# Patient Record
Sex: MALE | Race: WHITE | Employment: FULL TIME | URBAN - METROPOLITAN AREA
[De-identification: names, ages, dates, MRNs, and addresses within clinical notes are randomized per-mention and may not be internally consistent; named-entity substitution may affect disease eponyms.]

---

## 2017-07-03 PROBLEM — R04.2 MASSIVE HEMOPTYSIS: Status: ACTIVE | Noted: 2017-07-03

## 2020-01-15 ENCOUNTER — HOSPITAL ENCOUNTER (INPATIENT)
Age: 35
LOS: 2 days | Discharge: HOME | DRG: 950 | End: 2020-01-17
Attending: HOSPITALIST | Admitting: HOSPITALIST
Payer: MEDICAID

## 2020-01-15 ENCOUNTER — APPOINTMENT (OUTPATIENT)
Dept: GENERAL RADIOLOGY | Age: 35
DRG: 950 | End: 2020-01-15
Attending: HOSPITALIST
Payer: MEDICAID

## 2020-01-15 DIAGNOSIS — R04.2 COUGH WITH HEMOPTYSIS: Primary | ICD-10-CM

## 2020-01-15 PROBLEM — R07.9 CHEST PAIN IN ADULT: Status: ACTIVE | Noted: 2020-01-15

## 2020-01-15 PROBLEM — Z72.0 TOBACCO ABUSE: Status: ACTIVE | Noted: 2018-02-28

## 2020-01-15 LAB
ABO + RH BLD: NORMAL
ALBUMIN SERPL-MCNC: 3.7 G/DL (ref 3.4–5)
ALBUMIN/GLOB SERPL: 1.2 {RATIO} (ref 0.8–1.7)
ALP SERPL-CCNC: 56 U/L (ref 45–117)
ALT SERPL-CCNC: 16 U/L (ref 16–61)
ANION GAP SERPL CALC-SCNC: 3 MMOL/L (ref 3–18)
APTT PPP: 27.5 SEC (ref 23–36.4)
AST SERPL-CCNC: 9 U/L (ref 10–38)
ATRIAL RATE: 60 BPM
BASOPHILS # BLD: 0 K/UL (ref 0–0.1)
BASOPHILS NFR BLD: 1 % (ref 0–2)
BILIRUB SERPL-MCNC: 0.4 MG/DL (ref 0.2–1)
BLOOD GROUP ANTIBODIES SERPL: NORMAL
BUN SERPL-MCNC: 6 MG/DL (ref 7–18)
BUN/CREAT SERPL: 7 (ref 12–20)
CALCIUM SERPL-MCNC: 8.9 MG/DL (ref 8.5–10.1)
CALCULATED P AXIS, ECG09: 57 DEGREES
CALCULATED R AXIS, ECG10: 76 DEGREES
CALCULATED T AXIS, ECG11: 66 DEGREES
CHLORIDE SERPL-SCNC: 108 MMOL/L (ref 100–111)
CO2 SERPL-SCNC: 29 MMOL/L (ref 21–32)
CREAT SERPL-MCNC: 0.88 MG/DL (ref 0.6–1.3)
DIAGNOSIS, 93000: NORMAL
DIFFERENTIAL METHOD BLD: ABNORMAL
EOSINOPHIL # BLD: 0.1 K/UL (ref 0–0.4)
EOSINOPHIL NFR BLD: 1 % (ref 0–5)
ERYTHROCYTE [DISTWIDTH] IN BLOOD BY AUTOMATED COUNT: 13.1 % (ref 11.6–14.5)
EST. AVERAGE GLUCOSE BLD GHB EST-MCNC: 100 MG/DL
GLOBULIN SER CALC-MCNC: 3.1 G/DL (ref 2–4)
GLUCOSE BLD STRIP.AUTO-MCNC: 85 MG/DL (ref 70–110)
GLUCOSE SERPL-MCNC: 80 MG/DL (ref 74–99)
HBA1C MFR BLD: 5.1 % (ref 4.2–5.6)
HCT VFR BLD AUTO: 40.1 % (ref 36–48)
HCT VFR BLD AUTO: 40.6 % (ref 36–48)
HGB BLD-MCNC: 13.7 G/DL (ref 13–16)
HGB BLD-MCNC: 13.9 G/DL (ref 13–16)
INR PPP: 1 (ref 0.8–1.2)
LYMPHOCYTES # BLD: 3.2 K/UL (ref 0.9–3.6)
LYMPHOCYTES NFR BLD: 38 % (ref 21–52)
MAGNESIUM SERPL-MCNC: 2.2 MG/DL (ref 1.6–2.6)
MCH RBC QN AUTO: 30.4 PG (ref 24–34)
MCHC RBC AUTO-ENTMCNC: 34.7 G/DL (ref 31–37)
MCV RBC AUTO: 87.7 FL (ref 74–97)
MONOCYTES # BLD: 0.6 K/UL (ref 0.05–1.2)
MONOCYTES NFR BLD: 7 % (ref 3–10)
NEUTS SEG # BLD: 4.5 K/UL (ref 1.8–8)
NEUTS SEG NFR BLD: 53 % (ref 40–73)
P-R INTERVAL, ECG05: 114 MS
PHOSPHATE SERPL-MCNC: 4.1 MG/DL (ref 2.5–4.9)
PLATELET # BLD AUTO: 172 K/UL (ref 135–420)
PMV BLD AUTO: 9.8 FL (ref 9.2–11.8)
POTASSIUM SERPL-SCNC: 3.8 MMOL/L (ref 3.5–5.5)
PROT SERPL-MCNC: 6.8 G/DL (ref 6.4–8.2)
PROTHROMBIN TIME: 13.2 SEC (ref 11.5–15.2)
Q-T INTERVAL, ECG07: 418 MS
QRS DURATION, ECG06: 100 MS
QTC CALCULATION (BEZET), ECG08: 418 MS
RBC # BLD AUTO: 4.57 M/UL (ref 4.7–5.5)
SODIUM SERPL-SCNC: 140 MMOL/L (ref 136–145)
SPECIMEN EXP DATE BLD: NORMAL
T4 FREE SERPL-MCNC: 1 NG/DL (ref 0.7–1.5)
TSH SERPL DL<=0.05 MIU/L-ACNC: 3.41 UIU/ML (ref 0.36–3.74)
VENTRICULAR RATE, ECG03: 60 BPM
WBC # BLD AUTO: 8.4 K/UL (ref 4.6–13.2)

## 2020-01-15 PROCEDURE — 80053 COMPREHEN METABOLIC PANEL: CPT

## 2020-01-15 PROCEDURE — 85730 THROMBOPLASTIN TIME PARTIAL: CPT

## 2020-01-15 PROCEDURE — 83036 HEMOGLOBIN GLYCOSYLATED A1C: CPT

## 2020-01-15 PROCEDURE — 80307 DRUG TEST PRSMV CHEM ANLYZR: CPT

## 2020-01-15 PROCEDURE — 82962 GLUCOSE BLOOD TEST: CPT

## 2020-01-15 PROCEDURE — 74011250637 HC RX REV CODE- 250/637: Performed by: HOSPITALIST

## 2020-01-15 PROCEDURE — 93005 ELECTROCARDIOGRAM TRACING: CPT

## 2020-01-15 PROCEDURE — 71045 X-RAY EXAM CHEST 1 VIEW: CPT

## 2020-01-15 PROCEDURE — 74011250636 HC RX REV CODE- 250/636: Performed by: HOSPITALIST

## 2020-01-15 PROCEDURE — 84100 ASSAY OF PHOSPHORUS: CPT

## 2020-01-15 PROCEDURE — C9113 INJ PANTOPRAZOLE SODIUM, VIA: HCPCS | Performed by: HOSPITALIST

## 2020-01-15 PROCEDURE — 36415 COLL VENOUS BLD VENIPUNCTURE: CPT

## 2020-01-15 PROCEDURE — 65660000000 HC RM CCU STEPDOWN

## 2020-01-15 PROCEDURE — 84439 ASSAY OF FREE THYROXINE: CPT

## 2020-01-15 PROCEDURE — 85610 PROTHROMBIN TIME: CPT

## 2020-01-15 PROCEDURE — 74011000250 HC RX REV CODE- 250: Performed by: HOSPITALIST

## 2020-01-15 PROCEDURE — 84443 ASSAY THYROID STIM HORMONE: CPT

## 2020-01-15 PROCEDURE — 86900 BLOOD TYPING SEROLOGIC ABO: CPT

## 2020-01-15 PROCEDURE — 74011250637 HC RX REV CODE- 250/637

## 2020-01-15 PROCEDURE — 85018 HEMOGLOBIN: CPT

## 2020-01-15 PROCEDURE — 85025 COMPLETE CBC W/AUTO DIFF WBC: CPT

## 2020-01-15 PROCEDURE — 94760 N-INVAS EAR/PLS OXIMETRY 1: CPT

## 2020-01-15 PROCEDURE — 83735 ASSAY OF MAGNESIUM: CPT

## 2020-01-15 RX ORDER — DOCUSATE SODIUM 100 MG/1
100 CAPSULE, LIQUID FILLED ORAL DAILY
Status: DISCONTINUED | OUTPATIENT
Start: 2020-01-15 | End: 2020-01-17 | Stop reason: HOSPADM

## 2020-01-15 RX ORDER — SODIUM CHLORIDE 0.9 % (FLUSH) 0.9 %
5-40 SYRINGE (ML) INJECTION AS NEEDED
Status: DISCONTINUED | OUTPATIENT
Start: 2020-01-15 | End: 2020-01-15

## 2020-01-15 RX ORDER — AMINOCAPROIC ACID 500 MG/1
1000 TABLET ORAL EVERY 8 HOURS
COMMUNITY
Start: 2020-01-12

## 2020-01-15 RX ORDER — NITROGLYCERIN 0.4 MG/1
0.4 TABLET SUBLINGUAL
COMMUNITY

## 2020-01-15 RX ORDER — NALOXONE HYDROCHLORIDE 0.4 MG/ML
0.4 INJECTION, SOLUTION INTRAMUSCULAR; INTRAVENOUS; SUBCUTANEOUS AS NEEDED
Status: DISCONTINUED | OUTPATIENT
Start: 2020-01-15 | End: 2020-01-17 | Stop reason: HOSPADM

## 2020-01-15 RX ORDER — HYDROCODONE BITARTRATE AND ACETAMINOPHEN 5; 325 MG/1; MG/1
1 TABLET ORAL
COMMUNITY

## 2020-01-15 RX ORDER — DOCUSATE SODIUM 50 MG/5ML
100 LIQUID ORAL DAILY
Status: DISCONTINUED | OUTPATIENT
Start: 2020-01-15 | End: 2020-01-15 | Stop reason: CLARIF

## 2020-01-15 RX ORDER — HYDROCODONE BITARTRATE AND ACETAMINOPHEN 5; 325 MG/1; MG/1
1 TABLET ORAL
Status: DISCONTINUED | OUTPATIENT
Start: 2020-01-15 | End: 2020-01-15

## 2020-01-15 RX ORDER — DOCUSATE SODIUM 100 MG/1
100 CAPSULE, LIQUID FILLED ORAL
COMMUNITY

## 2020-01-15 RX ORDER — SODIUM CHLORIDE 0.9 % (FLUSH) 0.9 %
5-40 SYRINGE (ML) INJECTION EVERY 8 HOURS
Status: DISCONTINUED | OUTPATIENT
Start: 2020-01-15 | End: 2020-01-17 | Stop reason: HOSPADM

## 2020-01-15 RX ORDER — MAGNESIUM SULFATE 100 %
4 CRYSTALS MISCELLANEOUS AS NEEDED
Status: DISCONTINUED | OUTPATIENT
Start: 2020-01-15 | End: 2020-01-15

## 2020-01-15 RX ORDER — BENZONATATE 100 MG/1
100 CAPSULE ORAL
COMMUNITY

## 2020-01-15 RX ORDER — AMINOCAPROIC ACID 500 MG/1
1000 TABLET ORAL EVERY 8 HOURS
Status: DISCONTINUED | OUTPATIENT
Start: 2020-01-15 | End: 2020-01-17 | Stop reason: HOSPADM

## 2020-01-15 RX ORDER — DIPHENHYDRAMINE HYDROCHLORIDE 50 MG/ML
25 INJECTION, SOLUTION INTRAMUSCULAR; INTRAVENOUS
Status: DISCONTINUED | OUTPATIENT
Start: 2020-01-15 | End: 2020-01-17 | Stop reason: HOSPADM

## 2020-01-15 RX ORDER — SODIUM CHLORIDE 9 MG/ML
75 INJECTION, SOLUTION INTRAVENOUS CONTINUOUS
Status: DISPENSED | OUTPATIENT
Start: 2020-01-15 | End: 2020-01-16

## 2020-01-15 RX ORDER — INSULIN LISPRO 100 [IU]/ML
INJECTION, SOLUTION INTRAVENOUS; SUBCUTANEOUS EVERY 6 HOURS
Status: DISCONTINUED | OUTPATIENT
Start: 2020-01-15 | End: 2020-01-15

## 2020-01-15 RX ORDER — ONDANSETRON 2 MG/ML
4 INJECTION INTRAMUSCULAR; INTRAVENOUS
Status: DISCONTINUED | OUTPATIENT
Start: 2020-01-15 | End: 2020-01-17 | Stop reason: HOSPADM

## 2020-01-15 RX ORDER — HYDROCODONE BITARTRATE AND ACETAMINOPHEN 5; 325 MG/1; MG/1
1 TABLET ORAL
Status: DISCONTINUED | OUTPATIENT
Start: 2020-01-15 | End: 2020-01-16

## 2020-01-15 RX ORDER — DEXTROSE MONOHYDRATE 100 MG/ML
125-250 INJECTION, SOLUTION INTRAVENOUS AS NEEDED
Status: DISCONTINUED | OUTPATIENT
Start: 2020-01-15 | End: 2020-01-15

## 2020-01-15 RX ADMIN — Medication 10 ML: at 06:53

## 2020-01-15 RX ADMIN — SODIUM CHLORIDE 40 MG: 9 INJECTION, SOLUTION INTRAMUSCULAR; INTRAVENOUS; SUBCUTANEOUS at 21:25

## 2020-01-15 RX ADMIN — Medication 10 ML: at 14:00

## 2020-01-15 RX ADMIN — HYDROCODONE BITARTRATE AND ACETAMINOPHEN 1 TABLET: 5; 325 TABLET ORAL at 17:33

## 2020-01-15 RX ADMIN — SODIUM CHLORIDE 75 ML/HR: 900 INJECTION, SOLUTION INTRAVENOUS at 06:57

## 2020-01-15 RX ADMIN — DOCUSATE SODIUM 100 MG: 100 CAPSULE, LIQUID FILLED ORAL at 08:56

## 2020-01-15 RX ADMIN — HYDROCODONE BITARTRATE AND ACETAMINOPHEN 1 TABLET: 5; 325 TABLET ORAL at 13:28

## 2020-01-15 RX ADMIN — HYDROCODONE BITARTRATE AND ACETAMINOPHEN 1 TABLET: 5; 325 TABLET ORAL at 21:29

## 2020-01-15 RX ADMIN — AMINOCAPROIC ACID 1000 MG: 500 TABLET ORAL at 08:56

## 2020-01-15 RX ADMIN — Medication 10 ML: at 21:25

## 2020-01-15 RX ADMIN — AMINOCAPROIC ACID 1000 MG: 500 TABLET ORAL at 22:00

## 2020-01-15 RX ADMIN — AMINOCAPROIC ACID 1000 MG: 500 TABLET ORAL at 14:00

## 2020-01-15 NOTE — CONSULTS
Interventional Radiology     Consult received from Dr. Sanchez Began for evaluation of hemoptysis.     Case and images reviewed by Dr. Scott Solis. Will plan for image-guided bronchial artery embolization with moderate sedation as IR schedule allows on Thursday, January 16th. Patient to remain NPO after midnight with any blood thinning medications held. Orders placed.  Consent to be obtained prior to procedure.      Full consult note to follow.   [address] [place] [address].

## 2020-01-15 NOTE — CONSULTS
[facility]  Pulmonary, Critical Care, and Sleep Medicine    Initial Patient Consult    Name: Trisha Ramirez MRN: [id number]   : 1985 Hospital: [facility]   Date: 1/15/2020        IMPRESSION:   · Recurrent hemoptysis with extensive work-up failing to show specific etiology. Patient states that he has a diagnosis of HHT. No imaging abnormalities to suggest bronchiectasis, cystic or vasculitic lung disease  · History of hematuria  · History of isolated positive AMELIA in 2015 with subsequent AMELIA as well as double-stranded DNA, ANCA serologies all been negative in 2017      RECOMMENDATIONS:   · Will await IR procedure for more information  · As noted he has been extensively worked up at [facility] without a definitive diagnosis to explain his continued hemoptysis-at this point agree with bronchial arteriogram to identify source of bleeding  · We will follow and make further recommendation depending on findings and course     Subjective: This patient has been seen and evaluated at the request of Kristofer Soto for recurring hemoptysis. Patient is a 37 y.o. male was accepted in transfer from [facility] by interventional radiology for bronchial arteriogram and possible embolization procedure. Patient has a long complicated history of recurring hemoptysis for the past 8 years. He describes history of large amount of coughed up blood, this is recurrent hemoptysis and also has had  hematuria. His hemoptysis started at age 32 it is present nearly daily in small amounts, \"streaky\" with interspread episodes of severe, even massive hemoptysis: On one or more occaions has had syncope after hemoptysis. He has had extensive evaluations by multiple specialists: MDs / Veryl Georgiana / Thoracic surgery and multiple Pulmonologist and GI MDs, has had EGDs and bronchoscopies multiple times, none of them, per himself, able to clearly define the source of bleeding.   He has seen [facility], [address], [facility] , [facility], [facility], thoracic surgery. 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 He more recently has been following up with Dr. Tatyana Lind pulmonologist who on her last visit has noted following  \"- I am without explanation for continued hemoptysis  -Suspect pt has strange form of HHT or nasal hemangioma that we cannot find  -has had extensive workup including multiple bronchcoscopies, one with hep gtt to induce bleeding and all have been negative  -CTA multiple times for AVMs with none found  -head and neck CT negative  -eval for extrameduallary hematopeoisis has been negative  -trials of steroids, atenolol, asthma medications  -ENT Has seen pt   -EGD negative on last admission  -tried TXA 1600 TID x 1 month with no benefit. - No imaging or lab to indicate source of bleeding\"    In addition applicable cardiology and other lab data were also reviewed. Past Medical History:   Diagnosis Date    Hemoptysis       Past Surgical History:   Procedure Laterality Date    HX APPENDECTOMY      HX ORTHOPAEDIC      right arm    HX OTHER SURGICAL      Bronchoscopy, endoscopy      Prior to Admission medications    Medication Sig Start Date End Date Taking? Authorizing Provider   HYDROcodone-acetaminophen (NORCO) 5-325 mg per tablet Take 1 Tab by mouth every four (4) hours as needed for Pain. Indications: pain   Yes Provider, Historical   ondansetron HCl (ZOFRAN IV) 4 mg by IntraVENous route every four (4) hours as needed for Nausea. Yes Provider, Historical   TRANEXAMIC ACID PO Take 1,300 mg by mouth every eight (8) hours. Indications: coughing up of blood 1/11/20 1/15/20 Yes Provider, Historical   nitroglycerin (NITROSTAT) 0.4 mg SL tablet 0.4 mg by SubLINGual route every five (5) minutes as needed for Chest Pain.  Up to 3 doses. Yes Provider, Historical   aminocaproic acid (AMICAR) 500 mg tablet Take 1,000 mg by mouth every eight (8) hours. 20  Yes Provider, Historical   benzonatate (TESSALON PERLES) 100 mg capsule Take 100 mg by mouth three (3) times daily as needed for Cough. Indications: cough   Yes Provider, Historical   docusate sodium (COLACE) 100 mg capsule Take 100 mg by mouth two (2) times daily as needed for Constipation. Indications: constipation   Yes Provider, Historical   bisacodyl 5 mg tab Take 10 mg by mouth daily as needed. Indications: constipation   Yes Provider, Historical     Allergies   Allergen Reactions    Barium Sulfate Anaphylaxis     CT oral contrast per pt    Morphine (Pf) Anaphylaxis    Barium Iodide Anaphylaxis      Social History     Tobacco Use    Smoking status: Current Every Day Smoker     Packs/day: 0.50     Years: 12.00     Pack years: 6.00     Types: Cigarettes    Smokeless tobacco: Current User   Substance Use Topics    Alcohol use: No      Family History   Problem Relation Age of Onset    Cancer Mother         Current Facility-Administered Medications   Medication Dose Route Frequency    sodium chloride (NS) flush 5-40 mL  5-40 mL IntraVENous Q8H    0.9% sodium chloride infusion  75 mL/hr IntraVENous CONTINUOUS    insulin lispro (HUMALOG) injection   SubCUTAneous Q6H    docusate sodium (COLACE) capsule 100 mg  100 mg Per NG tube DAILY    aminocaproic acid (AMICAR) tablet 1,000 mg  1,000 mg Oral Q8H       Review of Systems:  A comprehensive review of systems was negative except for that written in the HPI.     Objective:   Vital Signs:    Visit Vitals  BP 90/63 (BP 1 Location: Left arm, BP Patient Position: Supine)   Pulse 78   Temp 98.2 °F (36.8 °C)   Resp 17   Ht 5' 9\" (1.753 m)   Wt 59.7 kg (131 lb 11.2 oz)   SpO2 97%   BMI 19.45 kg/m²               Temp (24hrs), Av °F (36.7 °C), Min:97.8 °F (36.6 °C), Max:98.2 °F (36.8 °C)       Intake/Output:   Last shift:      No intake/output data recorded. Last 3 shifts: No intake/output data recorded. No intake or output data in the 24 hours ending 01/15/20 1121   Physical Exam:   General:  Alert, cooperative, no distress, appears stated age. Head:  Normocephalic, without obvious abnormality, atraumatic. Eyes:  Conjunctivae/corneas clear. PERRL, EOMs intact. Nose: Nares normal. Septum midline. Mucosa normal. No drainage or sinus tenderness. Throat: Lips, mucosa, and tongue normal. Teeth and gums normal.   Neck: Supple, symmetrical, trachea midline, no adenopathy, thyroid: no enlargment/tenderness/nodules, no carotid bruit and no JVD. Back:   Symmetric, no curvature. ROM normal.   Lungs:   Clear to auscultation bilaterally. Chest wall:  No tenderness or deformity. Heart:  Regular rate and rhythm, S1, S2 normal, no murmur, click, rub or gallop. Abdomen:   Soft, non-tender. Bowel sounds normal. No masses,  No organomegaly. Extremities: Extremities normal, atraumatic, no cyanosis or edema. Pulses: 2+ and symmetric all extremities.    Skin: Skin color, texture, turgor normal. No rashes or lesions   Lymph nodes: Cervical, supraclavicular, and axillary nodes normal.   Neurologic: Grossly nonfocal     Data review:     Recent Results (from the past 24 hour(s))   PROTHROMBIN TIME + INR    Collection Time: 01/15/20  6:16 AM   Result Value Ref Range    Prothrombin time 13.2 11.5 - 15.2 sec    INR 1.0 0.8 - 1.2     PTT    Collection Time: 01/15/20  6:16 AM   Result Value Ref Range    aPTT 27.5 23.0 - 36.4 SEC   CBC WITH AUTOMATED DIFF    Collection Time: 01/15/20  6:16 AM   Result Value Ref Range    WBC 8.4 4.6 - 13.2 K/uL    RBC 4.57 (L) 4.70 - 5.50 M/uL    HGB 13.9 13.0 - 16.0 g/dL    HCT 40.1 36.0 - 48.0 %    MCV 87.7 74.0 - 97.0 FL    MCH 30.4 24.0 - 34.0 PG    MCHC 34.7 31.0 - 37.0 g/dL    RDW 13.1 11.6 - 14.5 %    PLATELET 836 265 - 597 K/uL    MPV 9.8 9.2 - 11.8 FL    NEUTROPHILS 53 40 - 73 %    LYMPHOCYTES 38 21 - 52 % MONOCYTES 7 3 - 10 %    EOSINOPHILS 1 0 - 5 %    BASOPHILS 1 0 - 2 %    ABS. NEUTROPHILS 4.5 1.8 - 8.0 K/UL    ABS. LYMPHOCYTES 3.2 0.9 - 3.6 K/UL    ABS. MONOCYTES 0.6 0.05 - 1.2 K/UL    ABS. EOSINOPHILS 0.1 0.0 - 0.4 K/UL    ABS. BASOPHILS 0.0 0.0 - 0.1 K/UL    DF AUTOMATED     TYPE & SCREEN    Collection Time: 01/15/20  6:16 AM   Result Value Ref Range    Crossmatch Expiration 01/18/2020     ABO/Rh(D) O POSITIVE     Antibody screen NEG    METABOLIC PANEL, COMPREHENSIVE    Collection Time: 01/15/20  6:16 AM   Result Value Ref Range    Sodium 140 136 - 145 mmol/L    Potassium 3.8 3.5 - 5.5 mmol/L    Chloride 108 100 - 111 mmol/L    CO2 29 21 - 32 mmol/L    Anion gap 3 3.0 - 18 mmol/L    Glucose 80 74 - 99 mg/dL    BUN 6 (L) 7.0 - 18 MG/DL    Creatinine 0.88 0.6 - 1.3 MG/DL    BUN/Creatinine ratio 7 (L) 12 - 20      GFR est AA >60 >60 ml/min/1.73m2    GFR est non-AA >60 >60 ml/min/1.73m2    Calcium 8.9 8.5 - 10.1 MG/DL    Bilirubin, total 0.4 0.2 - 1.0 MG/DL    ALT (SGPT) 16 16 - 61 U/L    AST (SGOT) 9 (L) 10 - 38 U/L    Alk.  phosphatase 56 45 - 117 U/L    Protein, total 6.8 6.4 - 8.2 g/dL    Albumin 3.7 3.4 - 5.0 g/dL    Globulin 3.1 2.0 - 4.0 g/dL    A-G Ratio 1.2 0.8 - 1.7     MAGNESIUM    Collection Time: 01/15/20  6:16 AM   Result Value Ref Range    Magnesium 2.2 1.6 - 2.6 mg/dL   PHOSPHORUS    Collection Time: 01/15/20  6:16 AM   Result Value Ref Range    Phosphorus 4.1 2.5 - 4.9 MG/DL   TSH 3RD GENERATION    Collection Time: 01/15/20  6:16 AM   Result Value Ref Range    TSH 3.41 0.36 - 3.74 uIU/mL   T4, FREE    Collection Time: 01/15/20  6:16 AM   Result Value Ref Range    T4, Free 1.0 0.7 - 1.5 NG/DL   HEMOGLOBIN A1C WITH EAG    Collection Time: 01/15/20  6:16 AM   Result Value Ref Range    Hemoglobin A1c 5.1 4.2 - 5.6 %    Est. average glucose 100 mg/dL   EKG, 12 LEAD, INITIAL    Collection Time: 01/15/20  6:33 AM   Result Value Ref Range    Ventricular Rate 60 BPM    Atrial Rate 60 BPM    P-R Interval 114 ms    QRS Duration 100 ms    Q-T Interval 418 ms    QTC Calculation (Bezet) 418 ms    Calculated P Axis 57 degrees    Calculated R Axis 76 degrees    Calculated T Axis 66 degrees    Diagnosis       Normal sinus rhythm with sinus arrhythmia  ST elevation, probably due to early repolarization  Borderline ECG  When compared with ECG of 15-LOBO-2020 06:33,  No significant change was found     GLUCOSE, POC    Collection Time: 01/15/20  6:47 AM   Result Value Ref Range    Glucose (POC) 85 70 - 110 mg/dL     Imaging:  I have personally reviewed the patients radiographs and have reviewed the reports:  XR Results (most recent):  Results from Hospital Encounter encounter on 01/15/20   XR CHEST PORT    Narrative Portable Chest    CPT CODE: 23930    HISTORY: Cough and hemoptysis. FINDINGS:     No prior study for comparison. Several wires overlie the patient. Heart size and mediastinal contours are within normal limits. No pulmonary  vascular congestion, effusion, or pneumothorax. No acute consolidation. Old  right-sided anterior right sixth rib fracture. .      Impression IMPRESSION:    No radiographic finding for an acute cardiopulmonary process       CT Results (most recent):  Results from Hospital Encounter encounter on 07/03/17   CTA CHEST W OR W WO CONT    Narrative Indication: Hemoptysis. Impression IMPRESSION: No pulmonary embolism. Resolved pulmonary nodules. Comment: 3-D CTA of the chest was performed following administration of  intravenous contrast. Multiplanar PE protocol and MIPS projections were  obtained. This was compared with March 25, 2017 and November 15, 2016. There is no pulmonary embolism or aortic dissection. The heart is of normal  size. Aorta is of normal caliber. No pleural or pericardial effusion. Small tiny pulmonary nodules seen on prior studies have resolved. No pulmonary  infiltrate nodule or mass. No adenopathy. The adrenal glands are unremarkable.                  Complex decision making was made in the evaluation and management plans during this consultation. More than 50% of time was spent in counseling and coordination of care including review of data and discussion with other team members.          Haley Santos MD

## 2020-01-15 NOTE — PROGRESS NOTES
Patient has been seen and evaluated in room 401; discussed admission with the Pulmonary, Dr Mak-Dr Pio Slaughter to consult. Paged IR, [phone], no call back, Dr Lind Odessa was consulted from [facility]. I have accepted the patient and placed essential orders.   Full H&P note to follow            \

## 2020-01-15 NOTE — PROGRESS NOTES
CM called and spoke to [address] [place] [address]. NORM let Mendez Conteh know that this patient's facesheet said this patient had Medicaid insurance, but the patient stated he was self pay, and his Medicaid had lapsed. Mendez Conteh said this patient had over income at this time for Medicaid.     Brijesh Mccord, RN  Case Management [phone]

## 2020-01-15 NOTE — PROGRESS NOTES
Reason for Admission:  Cough with hemoptysis [R04.2]  Chest pain in adult [R07.9]                 RRAT Score:    4            Plan for utilizing home health:    TBD                      Likelihood of Readmission:   LOW                         Transition of Care Plan:              Initial assessment completed with patient. Cognitive status of patient: oriented to time, place, person and situation. Face sheet information confirmed:  yes. The patient designates his friend Osvaldo Machado [phone] to participate in his discharge plan and to receive any needed information. This patient lives in a single family home by himself . Patient is able to navigate steps as needed. Prior to hospitalization, patient was considered to be independent with ADLs/IADLS : yes . Patient has a current ACP document on file: no  The patient may need to call a cab or taxi, or need transportation to take the patient  home upon discharge. The patient already has none reported,  medical equipment available in the home. Patient is not currently active with home health. Patient has not stayed in a skilled nursing facility or rehab. This patient is on dialysis :no     Currently, the discharge plan is Home. The patient states that he can obtain his medications from the pharmacy, and take his medications as directed. Patient's current insurance is Self Pay. Care Management Interventions  PCP Verified by CM: Yes  Mode of Transport at Discharge: Self(Patient may need to call a taxi or cab, or need transport set up at time of discharge.  Patient is not sure any of his friends will be available at discharge.)  Transition of Care Consult (CM Consult): Discharge Planning  Discharge Durable Medical Equipment: No  Physical Therapy Consult: No  Occupational Therapy Consult: No  Speech Therapy Consult: No  Current Support Network: Lives Alone  Confirm Follow Up Transport: Other (see comment)(Taxi, or cab, or friends.)  Discharge Location  Discharge Placement: [address] Sabrina Walker RN  Case Management [phone]

## 2020-01-15 NOTE — PROGRESS NOTES
Problem: Falls - Risk of  Goal: *Absence of Falls  Description  Document [facility] Fall Risk and appropriate interventions in the flowsheet.   Outcome: Progressing Towards Goal  Note: Fall Risk Interventions:                                Problem: Patient Education: Go to Patient Education Activity  Goal: Patient/Family Education  Outcome: Progressing Towards Goal     Problem: Pain  Goal: *Control of Pain  Outcome: Progressing Towards Goal  Goal: *PALLIATIVE CARE:  Alleviation of Pain  Outcome: Progressing Towards Goal     Problem: Patient Education: Go to Patient Education Activity  Goal: Patient/Family Education  Outcome: Progressing Towards Goal     Problem: Injury - Risk of, Adverse Drug Event  Goal: *Absence of adverse drug events  Outcome: Progressing Towards Goal  Goal: *Absence of medication errors  Outcome: Progressing Towards Goal  Goal: *Knowledge of prescribed medications  Outcome: Progressing Towards Goal     Problem: Patient Education: Go to Patient Education Activity  Goal: Patient/Family Education  Outcome: Progressing Towards Goal

## 2020-01-15 NOTE — ROUTINE PROCESS
Primary Nurse Rosa Maria Garrido RN and Frannie Perdomo RN performed a dual skin assessment on this patient No impairment noted Luke score is 23

## 2020-01-15 NOTE — PROGRESS NOTES
9350: [facility] Pt resting quietly during shift report. 1910: Pt with bout of hemoptysis. Complaints of pain. PRN administered x2 (see MAR). Bedside shift change report given to Regina Paul (oncoming nurse) by Kristian Tamayo (offgoing nurse). Report included the following information SBAR, Intake/Output, MAR and Recent Results.

## 2020-01-15 NOTE — ROUTINE PROCESS
Bedside shift change report given to Mann Burleson (oncoming nurse) by Hakeem Boles (offgoing nurse). Report included the following information SBAR and Kardex.

## 2020-01-15 NOTE — H&P
History & Physical    Patient: Johnathon Cleveland MRN: [id number]  CSN: [id number]    YOB: 1985  Age: 37 y.o. Sex: male      DOA: 1/15/2020       HPI:     Johnathon Cleveland is a 37 y.o. male arrives in transfer from [facility] after collaboration with pulmonary Dr. Nathaly Warren and IR Dr. Hardy Cortez for planned bronchial artery arteriogram for source of recurrent hemoptysis. Mr. Etta Romano reported a history of hereditary hemorrhagic telangectasia with episodes of recurrent hemoptysis for about 8 years. He has had extensive work-up with ENT, cardiothoracic surgery, several [facility], and follows with [facility]. Most recently he was admitted to [facility] 1/10/2020 for recurrent hemoptysis. CTA could not identify the source, IR could not identify an AVM. Patient improved after 2 days of TXA and aminocaproic acid. Pulmonary has planned coordination with IR for possible intervention with bilateral bronchial artery embolization by Dr. Hardy Cortez. Patient is admitted to telemetry. Please refer to the Discharge Summary 1/15/2020 from [facility] in Chart Review tab and paper copy on clipboard.     Past Medical History:   Diagnosis Date    H/O: hematuria     Hemoptysis     History of bradycardia     Tobacco abuse      Past Surgical History:   Procedure Laterality Date    HX APPENDECTOMY      HX ORTHOPAEDIC  1989    multiple right arm fractures repaired as a child    HX OTHER SURGICAL      Bronchoscopy, endoscopy       Family History   Problem Relation Age of Onset    Cancer Mother     Other Father         killed by step-brother per patient    Bleeding Prob Other         daughter with recurrent epistaxis     Social History     Socioeconomic History    Marital status:      Spouse name: Not on file    Number of children: 3    Years of education: Not on file    Highest education level: Not on file   Occupational History    Occupation:  Social Needs    Financial resource strain: Not on file    Food insecurity:     Worry: Not on file     Inability: Not on file    Transportation needs:     Medical: Not on file     Non-medical: Not on file   Tobacco Use    Smoking status: Current Every Day Smoker     Packs/day: 0.50     Years: 12.00     Pack years: 6.00     Types: Cigarettes    Smokeless tobacco: Current User   Substance and Sexual Activity    Alcohol use: No    Drug use: No    Sexual activity: Yes   Lifestyle    Physical activity:     Days per week: Not on file     Minutes per session: Not on file    Stress: Not on file   Relationships    Social connections:     Talks on phone: Not on file     Gets together: Not on file     Attends Muslim service: Not on file     Active member of club or organization: Not on file     Attends meetings of clubs or organizations: Not on file     Relationship status: Not on file    Intimate partner violence:     Fear of current or ex partner: Not on file     Emotionally abused: Not on file     Physically abused: Not on file     Forced sexual activity: Not on file   Other Topics Concern    Not on file   Social History Narrative    Lives alone, no pets       Prior to Admission medications    Medication Sig Start Date End Date Taking? Authorizing Provider   HYDROcodone-acetaminophen (NORCO) 5-325 mg per tablet Take 1 Tab by mouth every four (4) hours as needed for Pain. Indications: pain   Yes Provider, Historical   ondansetron HCl (ZOFRAN IV) 4 mg by IntraVENous route every four (4) hours as needed for Nausea. Yes Provider, Historical   TRANEXAMIC ACID PO Take 1,300 mg by mouth every eight (8) hours. Indications: coughing up of blood 1/11/20 1/15/20 Yes Provider, Historical   nitroglycerin (NITROSTAT) 0.4 mg SL tablet 0.4 mg by SubLINGual route every five (5) minutes as needed for Chest Pain. Up to 3 doses.    Yes Provider, Historical   aminocaproic acid (AMICAR) 500 mg tablet Take 1,000 mg by mouth every eight (8) hours. 1/12/20  Yes Provider, Historical   benzonatate (TESSALON PERLES) 100 mg capsule Take 100 mg by mouth three (3) times daily as needed for Cough. Indications: cough   Yes Provider, Historical   docusate sodium (COLACE) 100 mg capsule Take 100 mg by mouth two (2) times daily as needed for Constipation. Indications: constipation   Yes Provider, Historical   bisacodyl 5 mg tab Take 10 mg by mouth daily as needed. Indications: constipation   Yes Provider, Historical       Allergies   Allergen Reactions    Barium Sulfate Anaphylaxis     CT oral contrast per pt    Morphine (Pf) Anaphylaxis    Barium Iodide Anaphylaxis     Review of systems  12 point review of systems was performed with pertinent positives as in HPI  Additionally, past medical history lists hypothyroidism and patient stated he had hypothyroidism but was not agreeable with medication  Thyroid studies today are within normal limits  Patient states he continues to smoke about half a pack a day, smoking cessation education provided with emphasis on risk of dry airways having increased risk of bleeding         Physical Exam:      Visit Vitals  /81   Pulse 82   Temp 97.8 °F (36.6 °C)   Resp 16   Ht 5' 9\" (1.753 m)   Wt 59.7 kg (131 lb 11.2 oz)   SpO2 100%   BMI 19.45 kg/m²       Physical Exam:  GENERAL: alert, cooperative, mild distress  HEENT head atraumatic, pallor, conjunctiva clear, anicteric, facial symmetry, no nasal discharge detected, pharynx clear, neck supple  Chest lungs clear, heart rate 82 regular  Abdomen soft, nontender, bowel sounds positive  Extremities moving all 4 extremities well, calves nontender, no edema, positive peripheral pulses    When RN returned to room the patient was expectorating small amount of blood into emesis bag- re-evaluated patient- self limited expectoration. Resolved. Remained alert and hemodynamically stable. Lab/Data Review:  Results for Elmira Becker (MRN [id number])   Ref.  Range 1/15/2020 06:16   WBC Latest Ref Range: 4.6 - 13.2 K/uL 8.4   RBC Latest Ref Range: 4.70 - 5.50 M/uL 4.57 (L)   HGB Latest Ref Range: 13.0 - 16.0 g/dL 13.9   HCT Latest Ref Range: 36.0 - 48.0 % 40.1   MCV Latest Ref Range: 74.0 - 97.0 FL 87.7   MCH Latest Ref Range: 24.0 - 34.0 PG 30.4   MCHC Latest Ref Range: 31.0 - 37.0 g/dL 34.7   RDW Latest Ref Range: 11.6 - 14.5 % 13.1   PLATELET Latest Ref Range: 135 - 420 K/uL 172   MPV Latest Ref Range: 9.2 - 11.8 FL 9.8   NEUTROPHILS Latest Ref Range: 40 - 73 % 53   LYMPHOCYTES Latest Ref Range: 21 - 52 % 38   MONOCYTES Latest Ref Range: 3 - 10 % 7   EOSINOPHILS Latest Ref Range: 0 - 5 % 1   BASOPHILS Latest Ref Range: 0 - 2 % 1   DF Latest Units:   AUTOMATED   ABS. NEUTROPHILS Latest Ref Range: 1.8 - 8.0 K/UL 4.5   ABS. LYMPHOCYTES Latest Ref Range: 0.9 - 3.6 K/UL 3.2   ABS. MONOCYTES Latest Ref Range: 0.05 - 1.2 K/UL 0.6   ABS. EOSINOPHILS Latest Ref Range: 0.0 - 0.4 K/UL 0.1   ABS. BASOPHILS Latest Ref Range: 0.0 - 0.1 K/UL 0.0       Results for Esa Yeboah (MRN [id number])   Ref. Range 1/15/2020 06:16   INR Latest Ref Range: 0.8 - 1.2   1.0   Prothrombin time Latest Ref Range: 11.5 - 15.2 sec 13.2   aPTT Latest Ref Range: 23.0 - 36.4 SEC 27.5     Results for Esa Yeboah (MRN [id number])    Ref.  Range 1/15/2020 06:16   Sodium Latest Ref Range: 136 - 145 mmol/L 140   Potassium Latest Ref Range: 3.5 - 5.5 mmol/L 3.8   Chloride Latest Ref Range: 100 - 111 mmol/L 108   CO2 Latest Ref Range: 21 - 32 mmol/L 29   Anion gap Latest Ref Range: 3.0 - 18 mmol/L 3   Glucose Latest Ref Range: 74 - 99 mg/dL 80   BUN Latest Ref Range: 7.0 - 18 MG/DL 6 (L)   Creatinine Latest Ref Range: 0.6 - 1.3 MG/DL 0.88   BUN/Creatinine ratio Latest Ref Range: 12 - 20   7 (L)   Calcium Latest Ref Range: 8.5 - 10.1 MG/DL 8.9   Phosphorus Latest Ref Range: 2.5 - 4.9 MG/DL 4.1   Magnesium Latest Ref Range: 1.6 - 2.6 mg/dL 2.2   GFR est non-AA Latest Ref Range: >60 ml/min/1.73m2 >60   GFR est AA Latest Ref Range: >60 ml/min/1.73m2 >60   Bilirubin, total Latest Ref Range: 0.2 - 1.0 MG/DL 0.4   Protein, total Latest Ref Range: 6.4 - 8.2 g/dL 6.8   Albumin Latest Ref Range: 3.4 - 5.0 g/dL 3.7   Globulin Latest Ref Range: 2.0 - 4.0 g/dL 3.1   A-G Ratio Latest Ref Range: 0.8 - 1.7   1.2   ALT (SGPT) Latest Ref Range: 16 - 61 U/L 16   AST Latest Ref Range: 10 - 38 U/L 9 (L)   Alk. phosphatase Latest Ref Range: 45 - 117 U/L 56   Hemoglobin A1c, (calculated) Latest Ref Range: 4.2 - 5.6 % 5.1   Est. average glucose Latest Units: mg/dL 100     Results for Erick Saavedra (MRN [id number])    Ref. Range 1/15/2020 06:16   T4, Free Latest Ref Range: 0.7 - 1.5 NG/DL 1.0   TSH Latest Ref Range: 0.36 - 3.74 uIU/mL 3.41       All Micro Results     None          Imaging Reviewed:  XR Results (most recent):  Results from Hospital Encounter encounter on 01/15/20   XR CHEST PORT    Narrative Portable Chest    CPT CODE: 37063    HISTORY: Cough and hemoptysis. FINDINGS:     No prior study for comparison. Several wires overlie the patient. Heart size and mediastinal contours are within normal limits. No pulmonary  vascular congestion, effusion, or pneumothorax. No acute consolidation. Old  right-sided anterior right sixth rib fracture. .      Impression IMPRESSION:    No radiographic finding for an acute cardiopulmonary process     EKG 1/15/2020  Normal sinus rhythm with sinus arrhythmia   ST elevation, probably due to early repolarization   Borderline ECG       Assessment:   Principal Problem:    Cough with hemoptysis (1/15/2020)    Active Problems:    Chest pain in adult (1/15/2020)      Tobacco abuse (2/28/2018)      Bradycardia (7/22/2016)      Plan:   Admit to telemetry  Pulmonary consulted, appreciated  IR has been consulted from [facility] monitor  Monitor H&H  Type and screen  UA pending  Will continue Amicar  Had 5 days further of TXA-will hold for now  NPO pending IR eval today  Parenteral fluids while NPO  Pantoprazole 40 mg daily   TEDS and SCD's when in bed    Discussed advanced directives  Full Code    Reggie Dunn,   1/15/2020, 6:23 AM

## 2020-01-15 NOTE — ROUTINE PROCESS
TRANSFER - IN REPORT: 
 
Verbal report received from [facility]) on [address]  being received from [facility]) for transfer Report consisted of patients Situation, Background, Assessment and  
Recommendations(SBAR). Information from the following report(s) SBAR and Kardex was reviewed with the receiving nurse. Opportunity for questions and clarification was provided. Assessment completed upon patients arrival to unit and care assumed.

## 2020-01-15 NOTE — PROGRESS NOTES
conducted an initial consultation and Spiritual Assessment for Minerva Palacio, who is a 37 y.o.,male. Patients Primary Language is: [place]. According to the patients EMR Alevism Affiliation is: [place]. The reason the Patient came to the hospital is:   Patient Active Problem List    Diagnosis Date Noted    Cough with hemoptysis 01/15/2020    Chest pain in adult 01/15/2020    Tobacco abuse 02/28/2018    Massive hemoptysis 07/03/2017    Hypothyroidism 07/22/2016    Bradycardia 07/22/2016    Abnormal CT scan, chest 08/06/2015        The  provided the following Interventions:  Initiated a relationship of care and support. Explored issues of rocio, spirituality and/or Advent needs while hospitalized. Listened empathically. Provided chaplaincy education. Provided information about Spiritual Care Services. Offered prayer and assurance of continued prayers on patient's behalf. Chart reviewed. The following outcomes were achieved:  Patient shared some information about their medical narrative and spiritual journey/beliefs. Patient processed feeling about current hospitalization. Patient expressed gratitude for the 's visit. Assessment:  Patient did not indicate any spiritual or Advent issues which require Spiritual Care Services interventions at this time. Patient does not have any Advent/cultural needs that will affect patients preferences in health care. Plan:  Chaplains will continue to follow and will provide pastoral care on an as needed or requested basis.  recommends bedside caregivers page  on duty if patient shows signs of acute spiritual or emotional distress.     [address] [facility] Department  [phone]

## 2020-01-16 ENCOUNTER — HOSPITAL ENCOUNTER (INPATIENT)
Dept: INTERVENTIONAL RADIOLOGY/VASCULAR | Age: 35
Discharge: HOME | DRG: 950 | End: 2020-01-16
Attending: HOSPITALIST | Admitting: HOSPITALIST
Payer: MEDICAID

## 2020-01-16 VITALS
RESPIRATION RATE: 13 BRPM | HEART RATE: 40 BPM | DIASTOLIC BLOOD PRESSURE: 73 MMHG | OXYGEN SATURATION: 100 % | SYSTOLIC BLOOD PRESSURE: 137 MMHG

## 2020-01-16 LAB
AMPHET UR QL SCN: NEGATIVE
APPEARANCE UR: CLEAR
BACTERIA URNS QL MICRO: ABNORMAL /HPF
BARBITURATES UR QL SCN: NEGATIVE
BENZODIAZ UR QL: POSITIVE
BILIRUB UR QL: NEGATIVE
CANNABINOIDS UR QL SCN: POSITIVE
COCAINE UR QL SCN: NEGATIVE
COLOR UR: YELLOW
EPITH CASTS URNS QL MICRO: ABNORMAL /LPF (ref 0–5)
GLUCOSE BLD STRIP.AUTO-MCNC: 107 MG/DL (ref 70–110)
GLUCOSE BLD STRIP.AUTO-MCNC: 96 MG/DL (ref 70–110)
GLUCOSE UR STRIP.AUTO-MCNC: NEGATIVE MG/DL
HCT VFR BLD AUTO: 37.4 % (ref 36–48)
HCT VFR BLD AUTO: 38.9 % (ref 36–48)
HDSCOM,HDSCOM: ABNORMAL
HGB BLD-MCNC: 12.9 G/DL (ref 13–16)
HGB BLD-MCNC: 13.4 G/DL (ref 13–16)
HGB UR QL STRIP: NEGATIVE
KETONES UR QL STRIP.AUTO: NEGATIVE MG/DL
LEUKOCYTE ESTERASE UR QL STRIP.AUTO: NEGATIVE
METHADONE UR QL: NEGATIVE
NITRITE UR QL STRIP.AUTO: NEGATIVE
OPIATES UR QL: POSITIVE
PCP UR QL: NEGATIVE
PH UR STRIP: 5 [PH] (ref 5–8)
PROT UR STRIP-MCNC: NEGATIVE MG/DL
RBC #/AREA URNS HPF: ABNORMAL /HPF (ref 0–5)
SP GR UR REFRACTOMETRY: >1.03 (ref 1–1.03)
UROBILINOGEN UR QL STRIP.AUTO: 1 EU/DL (ref 0.2–1)
WBC URNS QL MICRO: ABNORMAL /HPF (ref 0–4)

## 2020-01-16 PROCEDURE — 82962 GLUCOSE BLOOD TEST: CPT

## 2020-01-16 PROCEDURE — 77030004561 HC CATH ANGI DX COBRA ANGI -B

## 2020-01-16 PROCEDURE — 74011250637 HC RX REV CODE- 250/637: Performed by: INTERNAL MEDICINE

## 2020-01-16 PROCEDURE — 03LY3DZ OCCLUSION OF UPPER ARTERY WITH INTRALUMINAL DEVICE, PERCUTANEOUS APPROACH: ICD-10-PCS | Performed by: RADIOLOGY

## 2020-01-16 PROCEDURE — 85018 HEMOGLOBIN: CPT

## 2020-01-16 PROCEDURE — B31SYZZ FLUOROSCOPY OF RIGHT PULMONARY ARTERY USING OTHER CONTRAST: ICD-10-PCS | Performed by: RADIOLOGY

## 2020-01-16 PROCEDURE — B31LYZZ FLUOROSCOPY OF INTERCOSTAL AND BRONCHIAL ARTERIES USING OTHER CONTRAST: ICD-10-PCS | Performed by: RADIOLOGY

## 2020-01-16 PROCEDURE — 74011636320 HC RX REV CODE- 636/320: Performed by: RADIOLOGY

## 2020-01-16 PROCEDURE — 77030016064 HC PARTIC EMBSPHR BSPH -D

## 2020-01-16 PROCEDURE — 74011250636 HC RX REV CODE- 250/636

## 2020-01-16 PROCEDURE — 77030008584 HC TOOL GDWRE DEV TERU -A

## 2020-01-16 PROCEDURE — 36415 COLL VENOUS BLD VENIPUNCTURE: CPT

## 2020-01-16 PROCEDURE — C1769 GUIDE WIRE: HCPCS

## 2020-01-16 PROCEDURE — 74011000250 HC RX REV CODE- 250: Performed by: RADIOLOGY

## 2020-01-16 PROCEDURE — 77030022017 HC DRSG HEMO QCLOT ZMED -A

## 2020-01-16 PROCEDURE — 74011250637 HC RX REV CODE- 250/637: Performed by: HOSPITALIST

## 2020-01-16 PROCEDURE — B41GYZZ FLUOROSCOPY OF LEFT LOWER EXTREMITY ARTERIES USING OTHER CONTRAST: ICD-10-PCS | Performed by: RADIOLOGY

## 2020-01-16 PROCEDURE — 74011250637 HC RX REV CODE- 250/637: Performed by: FAMILY MEDICINE

## 2020-01-16 PROCEDURE — 37244 VASC EMBOLIZE/OCCLUDE BLEED: CPT

## 2020-01-16 PROCEDURE — 81001 URINALYSIS AUTO W/SCOPE: CPT

## 2020-01-16 PROCEDURE — C1760 CLOSURE DEV, VASC: HCPCS

## 2020-01-16 PROCEDURE — B31TYZZ FLUOROSCOPY OF LEFT PULMONARY ARTERY USING OTHER CONTRAST: ICD-10-PCS | Performed by: RADIOLOGY

## 2020-01-16 PROCEDURE — 74011250636 HC RX REV CODE- 250/636: Performed by: RADIOLOGY

## 2020-01-16 PROCEDURE — 65660000000 HC RM CCU STEPDOWN

## 2020-01-16 RX ORDER — MIDAZOLAM HYDROCHLORIDE 1 MG/ML
1 INJECTION, SOLUTION INTRAMUSCULAR; INTRAVENOUS
Status: DISCONTINUED | OUTPATIENT
Start: 2020-01-16 | End: 2020-01-17 | Stop reason: ALTCHOICE

## 2020-01-16 RX ORDER — FENTANYL CITRATE 50 UG/ML
12.5-5 INJECTION, SOLUTION INTRAMUSCULAR; INTRAVENOUS
Status: DISCONTINUED | OUTPATIENT
Start: 2020-01-16 | End: 2020-01-17 | Stop reason: ALTCHOICE

## 2020-01-16 RX ORDER — FENTANYL CITRATE 50 UG/ML
INJECTION, SOLUTION INTRAMUSCULAR; INTRAVENOUS
Status: COMPLETED
Start: 2020-01-16 | End: 2020-01-16

## 2020-01-16 RX ORDER — HEPARIN SODIUM 200 [USP'U]/100ML
INJECTION, SOLUTION INTRAVENOUS
Status: DISPENSED
Start: 2020-01-16 | End: 2020-01-16

## 2020-01-16 RX ORDER — LIDOCAINE HYDROCHLORIDE 10 MG/ML
30 INJECTION, SOLUTION EPIDURAL; INFILTRATION; INTRACAUDAL; PERINEURAL ONCE
Status: COMPLETED | OUTPATIENT
Start: 2020-01-16 | End: 2020-01-16

## 2020-01-16 RX ORDER — MIDAZOLAM HYDROCHLORIDE 1 MG/ML
INJECTION, SOLUTION INTRAMUSCULAR; INTRAVENOUS
Status: COMPLETED
Start: 2020-01-16 | End: 2020-01-16

## 2020-01-16 RX ORDER — OXYCODONE AND ACETAMINOPHEN 5; 325 MG/1; MG/1
1-2 TABLET ORAL
Status: DISCONTINUED | OUTPATIENT
Start: 2020-01-16 | End: 2020-01-17

## 2020-01-16 RX ORDER — IODIXANOL 320 MG/ML
200 INJECTION, SOLUTION INTRAVASCULAR
Status: COMPLETED | OUTPATIENT
Start: 2020-01-16 | End: 2020-01-16

## 2020-01-16 RX ORDER — HYDROCODONE BITARTRATE AND ACETAMINOPHEN 5; 325 MG/1; MG/1
1 TABLET ORAL ONCE
Status: COMPLETED | OUTPATIENT
Start: 2020-01-16 | End: 2020-01-16

## 2020-01-16 RX ADMIN — FENTANYL CITRATE 50 MCG: 50 INJECTION, SOLUTION INTRAMUSCULAR; INTRAVENOUS at 09:10

## 2020-01-16 RX ADMIN — FENTANYL CITRATE 50 MCG: 50 INJECTION, SOLUTION INTRAMUSCULAR; INTRAVENOUS at 10:35

## 2020-01-16 RX ADMIN — AMINOCAPROIC ACID 1000 MG: 500 TABLET ORAL at 06:00

## 2020-01-16 RX ADMIN — MIDAZOLAM 1 MG: 1 INJECTION INTRAMUSCULAR; INTRAVENOUS at 09:10

## 2020-01-16 RX ADMIN — MIDAZOLAM 1 MG: 1 INJECTION INTRAMUSCULAR; INTRAVENOUS at 11:15

## 2020-01-16 RX ADMIN — AMINOCAPROIC ACID 1000 MG: 500 TABLET ORAL at 14:00

## 2020-01-16 RX ADMIN — Medication 10 ML: at 21:53

## 2020-01-16 RX ADMIN — MIDAZOLAM 1 MG: 1 INJECTION INTRAMUSCULAR; INTRAVENOUS at 10:35

## 2020-01-16 RX ADMIN — FENTANYL CITRATE 50 MCG: 50 INJECTION, SOLUTION INTRAMUSCULAR; INTRAVENOUS at 09:20

## 2020-01-16 RX ADMIN — HYDROCODONE BITARTRATE AND ACETAMINOPHEN 1 TABLET: 5; 325 TABLET ORAL at 02:10

## 2020-01-16 RX ADMIN — OXYCODONE HYDROCHLORIDE AND ACETAMINOPHEN 2 TABLET: 5; 325 TABLET ORAL at 21:53

## 2020-01-16 RX ADMIN — HYDROCODONE BITARTRATE AND ACETAMINOPHEN 1 TABLET: 5; 325 TABLET ORAL at 07:46

## 2020-01-16 RX ADMIN — Medication 10 ML: at 13:28

## 2020-01-16 RX ADMIN — MIDAZOLAM 1 MG: 1 INJECTION INTRAMUSCULAR; INTRAVENOUS at 09:20

## 2020-01-16 RX ADMIN — HYDROCODONE BITARTRATE AND ACETAMINOPHEN 1 TABLET: 5; 325 TABLET ORAL at 02:21

## 2020-01-16 RX ADMIN — LIDOCAINE HYDROCHLORIDE 30 ML: 10 INJECTION, SOLUTION EPIDURAL; INFILTRATION; INTRACAUDAL; PERINEURAL at 09:13

## 2020-01-16 RX ADMIN — Medication 10 ML: at 07:52

## 2020-01-16 RX ADMIN — FENTANYL CITRATE 50 MCG: 50 INJECTION, SOLUTION INTRAMUSCULAR; INTRAVENOUS at 11:15

## 2020-01-16 RX ADMIN — AMINOCAPROIC ACID 1000 MG: 500 TABLET ORAL at 22:00

## 2020-01-16 RX ADMIN — OXYCODONE HYDROCHLORIDE AND ACETAMINOPHEN 2 TABLET: 5; 325 TABLET ORAL at 18:04

## 2020-01-16 RX ADMIN — FENTANYL CITRATE 50 MCG: 50 INJECTION, SOLUTION INTRAMUSCULAR; INTRAVENOUS at 10:15

## 2020-01-16 RX ADMIN — FENTANYL CITRATE 50 MCG: 50 INJECTION, SOLUTION INTRAMUSCULAR; INTRAVENOUS at 09:15

## 2020-01-16 RX ADMIN — IODIXANOL 200 ML: 320 INJECTION, SOLUTION INTRAVASCULAR at 09:32

## 2020-01-16 RX ADMIN — MIDAZOLAM 1 MG: 1 INJECTION INTRAMUSCULAR; INTRAVENOUS at 10:15

## 2020-01-16 RX ADMIN — HYDROCODONE BITARTRATE AND ACETAMINOPHEN 1 TABLET: 5; 325 TABLET ORAL at 13:26

## 2020-01-16 RX ADMIN — MIDAZOLAM 1 MG: 1 INJECTION INTRAMUSCULAR; INTRAVENOUS at 09:15

## 2020-01-16 NOTE — PROGRESS NOTES
[facility]  Progress Note    Patient: Patricia Prescott Age: 37 y.o. : 1985 MR#: [id number] SSN: [id number]  Date: 2020    Subjective/24-hour events:     Complains of some pain but otherwise nothing new or acute. Denies dizziness/lightheadedness but does report having had an episode of hemoptysis earlier this AM.    Assessment:   Hemoptysis, etiology uncertain  Bradycardia  Tobacco use disorder    Plan:  Continue to monitor post procedure, follow H&H PRN. Monitor heart rates. Analgesia as necessary. Would benefit from smoking cessation. Best supportive care o/w. Follow. Case discussed with:  [x]Patient  []Family  [x]Nursing  [x]Case Management  DVT Prophylaxis:  []Lovenox  []Hep SQ  []SCDs  []Coumadin   []On Heparin gtt    Objective:   VS:   Visit Vitals  /59 (BP 1 Location: Left arm, BP Patient Position: Supine)   Pulse (!) 47   Temp 97.9 °F (36.6 °C)   Resp 17   Ht 5' 9\" (1.753 m)   Wt 59.7 kg (131 lb 11.2 oz)   SpO2 98%   BMI 19.45 kg/m²      Tmax/24hrs: Temp (24hrs), Av.8 °F (36.6 °C), Min:97 °F (36.1 °C), Max:98.1 °F (36.7 °C)      Intake/Output Summary (Last 24 hours) at 2020 1516  Last data filed at 1/15/2020 1915  Gross per 24 hour   Intake 1803.75 ml   Output 250 ml   Net 1553.75 ml       General: In NAD. Cardiovascular: Regular, mildly bradycardic. Pulmonary: Clear, no wheezes. GI: Abdomen soft, nontender to palpation  Extremities: Warm, no edema. Additional: Awake and alert, moves extremities spontaneously.     Labs:    Recent Results (from the past 24 hour(s))   HGB & HCT    Collection Time: 01/15/20  8:25 PM   Result Value Ref Range    HGB 13.7 13.0 - 16.0 g/dL    HCT 40.6 36.0 - 48.0 %   GLUCOSE, POC    Collection Time: 20 12:29 AM   Result Value Ref Range    Glucose (POC) 107 70 - 110 mg/dL   HGB & HCT    Collection Time: 20  4:44 AM   Result Value Ref Range    HGB 13.4 13.0 - 16.0 g/dL    HCT 38.9 36.0 - 48.0 % GLUCOSE, POC    Collection Time: 01/16/20  6:08 AM   Result Value Ref Range    Glucose (POC) 96 70 - 110 mg/dL   HGB & HCT    Collection Time: 01/16/20 12:54 PM   Result Value Ref Range    HGB 12.9 (L) 13.0 - 16.0 g/dL    HCT 37.4 36.0 - 48.0 %   URINALYSIS W/MICROSCOPIC    Collection Time: 01/16/20  2:00 PM   Result Value Ref Range    Color YELLOW      Appearance CLEAR      Specific gravity >1.030 (H) 1.005 - 1.030    pH (UA) 5.0 5.0 - 8.0      Protein NEGATIVE  NEG mg/dL    Glucose NEGATIVE  NEG mg/dL    Ketone NEGATIVE  NEG mg/dL    Bilirubin NEGATIVE  NEG      Blood NEGATIVE  NEG      Urobilinogen 1.0 0.2 - 1.0 EU/dL    Nitrites NEGATIVE  NEG      Leukocyte Esterase NEGATIVE  NEG      WBC NONE 0 - 4 /hpf    RBC NONE 0 - 5 /hpf    Epithelial cells FEW 0 - 5 /lpf    Bacteria FEW (A) NEG /hpf       Signed By: Sue Parra MD     January 16, 2020

## 2020-01-16 NOTE — PROGRESS NOTES
Preprocedure Assessment      Today 1/16/2020     Indication/Symptoms:   Wily Perez is a 37 y.o. male with a history of recurrent hemoptysis who presents to IR for an image-guided bronchial angiogram +/- embolization with moderate sedation. Medications and labs reviewed. Patient has been NPO since midnight. Blood thinners held. The H & P and/or progress notes and any available imaging were reviewed. The risks, indications and possible alternatives to the procedure, including doing nothing, were discussed and informed consent was obtained. Physical Exam:      Heart:  Regular rate   Lungs:  Normal respiratory effort    The patient is an appropriate candidate to undergo the planned procedure and sedation.     Sissy Never, [address]

## 2020-01-16 NOTE — PROGRESS NOTES
[facility]  Pulmonary, Critical Care, and Sleep Medicine    Progress Note    Name: Chris Yanez MRN: [id number]   : 1985 Hospital: [address] [place] [address]   Date: 2020        IMPRESSION:   · Recurrent hemoptysis with extensive work-up failing to show specific etiology. Patient states that he has a diagnosis of HHT. No imaging abnormalities to suggest bronchiectasis, cystic or vasculitic lung disease. S/P bronchial artery embolization- bilateral 2020  · Chest Pain- post procedure will need monitoring  · History of hematuria  · History of isolated positive AMELIA in  with subsequent AMELIA as well as double-stranded DNA, ANCA serologies all been negative in 2017      RECOMMENDATIONS:   · Monitor for hemoptysis post procedure  · As noted he has been extensively worked up at [place] without a definitive diagnosis to explain his continued hemoptysis. No further testing needed  · We will follow and make further recommendation depending on course     Subjective: This patient has been seen and evaluated at the request of Agatha Eller for recurring hemoptysis. Patient is a 37 y.o. male was accepted in transfer from [facility] by interventional radiology for bronchial arteriogram and possible embolization procedure. Patient has a long complicated history of recurring hemoptysis for the past 8 years. 20   Seen post procedure- bronchial arterial embolization. C/o chest pain- all across  Denies hemoptysis  Denies cough  Laying flat  No nausea  Denies other complaints      HPI:  He describes history of large amount of coughed up blood, this is recurrent hemoptysis and also has had  hematuria. His hemoptysis started at age 32 it is present nearly daily in small amounts, \"streaky\" with interspread episodes of severe, even massive hemoptysis: On one or more occaions has had syncope after hemoptysis.   He has had extensive evaluations by multiple specialists: MDs / Jodee Verdeo / Thoracic surgery and multiple Pulmonologist and GI MDs, has had EGDs and bronchoscopies multiple times, none of them, per himself, able to clearly define the source of bleeding. He has seen [facility], [address] [place] [address], [facility] , [facility], [facility], thoracic surgery. 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 He more recently has been following up with Dr. Stewart Mclean pulmonologist who on her last visit has noted following  \"- I am without explanation for continued hemoptysis  -Suspect pt has strange form of HHT or nasal hemangioma that we cannot find  -has had extensive workup including multiple bronchcoscopies, one with hep gtt to induce bleeding and all have been negative  -CTA multiple times for AVMs with none found  -head and neck CT negative  -eval for extrameduallary hematopeoisis has been negative  -trials of steroids, atenolol, asthma medications  -ENT Has seen pt   -EGD negative on last admission  -tried TXA 1600 TID x 1 month with no benefit. - No imaging or lab to indicate source of bleeding\"    In addition applicable cardiology and other lab data were also reviewed. Past Medical History:   Diagnosis Date    H/O: hematuria     Hemoptysis     History of bradycardia     Tobacco abuse       Past Surgical History:   Procedure Laterality Date    HX APPENDECTOMY      HX ORTHOPAEDIC  1989    multiple right arm fractures repaired as a child    HX OTHER SURGICAL      Bronchoscopy, endoscopy      Prior to Admission medications    Medication Sig Start Date End Date Taking? Authorizing Provider   HYDROcodone-acetaminophen (NORCO) 5-325 mg per tablet Take 1 Tab by mouth every four (4) hours as needed for Pain.  Indications: pain   Yes Provider, Historical   ondansetron HCl (ZOFRAN IV) 4 mg by IntraVENous route every four (4) hours as needed for Nausea. Yes Provider, Historical   TRANEXAMIC ACID PO Take 1,300 mg by mouth every eight (8) hours. Indications: coughing up of blood 1/11/20 1/15/20 Yes Provider, Historical   nitroglycerin (NITROSTAT) 0.4 mg SL tablet 0.4 mg by SubLINGual route every five (5) minutes as needed for Chest Pain. Up to 3 doses. Yes Provider, Historical   aminocaproic acid (AMICAR) 500 mg tablet Take 1,000 mg by mouth every eight (8) hours. 20  Yes Provider, Historical   benzonatate (TESSALON PERLES) 100 mg capsule Take 100 mg by mouth three (3) times daily as needed for Cough. Indications: cough   Yes Provider, Historical   docusate sodium (COLACE) 100 mg capsule Take 100 mg by mouth two (2) times daily as needed for Constipation. Indications: constipation   Yes Provider, Historical   bisacodyl 5 mg tab Take 10 mg by mouth daily as needed. Indications: constipation   Yes Provider, Historical     Allergies   Allergen Reactions    Barium Sulfate Anaphylaxis     CT oral contrast per pt    Morphine (Pf) Anaphylaxis    Barium Iodide Anaphylaxis      Current Facility-Administered Medications   Medication Dose Route Frequency    sodium chloride (NS) flush 5-40 mL  5-40 mL IntraVENous Q8H    docusate sodium (COLACE) capsule 100 mg  100 mg Per NG tube DAILY    aminocaproic acid (AMICAR) tablet 1,000 mg  1,000 mg Oral Q8H    pantoprazole (PROTONIX) 40 mg in 0.9% sodium chloride 10 mL injection  40 mg IntraVENous DAILY       Review of Systems:  A comprehensive review of systems was negative except for that written in the HPI.     Objective:   Vital Signs:    Visit Vitals  /59 (BP 1 Location: Left arm, BP Patient Position: Supine)   Pulse (!) 47   Temp 97.9 °F (36.6 °C)   Resp 17   Ht 5' 9\" (1.753 m)   Wt 59.7 kg (131 lb 11.2 oz)   SpO2 98%   BMI 19.45 kg/m²       O2 Device: Room air       Temp (24hrs), Av.8 °F (36.6 °C), Min:97 °F (36.1 °C), Max:98.1 °F (36.7 °C)       Intake/Output:   Last shift: No intake/output data recorded. Last 3 shifts: 01/14 1901 - 01/16 0700  In: 1803.8 [P.O.:960; I.V.:843.8]  Out: 250 [Urine:250]    Intake/Output Summary (Last 24 hours) at 1/16/2020 1403  Last data filed at 1/15/2020 1915  Gross per 24 hour   Intake 1803.75 ml   Output 250 ml   Net 1553.75 ml      Physical Exam:   General:  Alert, cooperative, no distress, appears stated age. Head:  Normocephalic, without obvious abnormality, atraumatic. Eyes:  Conjunctivae/corneas clear. PERRL, EOMs intact. Nose: Nares normal. Septum midline. Mucosa normal. No drainage or sinus tenderness. Throat: Lips, mucosa, and tongue normal. Teeth and gums normal.   Neck: Supple, symmetrical, trachea midline, no adenopathy, thyroid: no enlargment/tenderness/nodules, no carotid bruit and no JVD. Back:   Symmetric, no curvature. ROM normal.   Lungs:   Clear to auscultation bilaterally. Chest wall:  No tenderness or deformity. Heart:  Regular rate and rhythm, S1, S2 normal, no murmur, click, rub or gallop. Abdomen:   Soft, non-tender. Bowel sounds normal. No masses,  No organomegaly. Extremities: Extremities normal, atraumatic, no cyanosis or edema. Pulses: 2+ and symmetric all extremities.    Skin: Skin color, texture, turgor normal. No rashes or lesions   Lymph nodes: Cervical, supraclavicular, and axillary nodes normal.   Neurologic: Grossly nonfocal     Data review:     Recent Results (from the past 24 hour(s))   HGB & HCT    Collection Time: 01/15/20  8:25 PM   Result Value Ref Range    HGB 13.7 13.0 - 16.0 g/dL    HCT 40.6 36.0 - 48.0 %   GLUCOSE, POC    Collection Time: 01/16/20 12:29 AM   Result Value Ref Range    Glucose (POC) 107 70 - 110 mg/dL   HGB & HCT    Collection Time: 01/16/20  4:44 AM   Result Value Ref Range    HGB 13.4 13.0 - 16.0 g/dL    HCT 38.9 36.0 - 48.0 %   GLUCOSE, POC    Collection Time: 01/16/20  6:08 AM   Result Value Ref Range    Glucose (POC) 96 70 - 110 mg/dL   HGB & HCT    Collection Time: 01/16/20 12:54 PM   Result Value Ref Range    HGB 12.9 (L) 13.0 - 16.0 g/dL    HCT 37.4 36.0 - 48.0 %     Imaging:  I have personally reviewed the patients radiographs and have reviewed the reports:  XR Results (most recent):  Results from Hospital Encounter encounter on 01/15/20   XR CHEST PORT    Narrative Portable Chest    CPT CODE: 58902    HISTORY: Cough and hemoptysis. FINDINGS:     No prior study for comparison. Several wires overlie the patient. Heart size and mediastinal contours are within normal limits. No pulmonary  vascular congestion, effusion, or pneumothorax. No acute consolidation. Old  right-sided anterior right sixth rib fracture. .      Impression IMPRESSION:    No radiographic finding for an acute cardiopulmonary process       CT Results (most recent):  Results from Hospital Encounter encounter on 07/03/17   CTA CHEST W OR W WO CONT    Narrative Indication: Hemoptysis. Impression IMPRESSION: No pulmonary embolism. Resolved pulmonary nodules. Comment: 3-D CTA of the chest was performed following administration of  intravenous contrast. Multiplanar PE protocol and MIPS projections were  obtained. This was compared with March 25, 2017 and November 15, 2016. There is no pulmonary embolism or aortic dissection. The heart is of normal  size. Aorta is of normal caliber. No pleural or pericardial effusion. Small tiny pulmonary nodules seen on prior studies have resolved. No pulmonary  infiltrate nodule or mass. No adenopathy. The adrenal glands are unremarkable. Complex decision making was made in the evaluation and management plans during this consultation. More than 50% of time was spent in counseling and coordination of care including review of data and discussion with other team members.          Ishmael Galeano MD

## 2020-01-16 NOTE — PROGRESS NOTES
Verbal report given to Miesha Howard on [address]. Patient NAD, VSS and A&O x3. Patient w/ complaint of chest discomfort after procedure, receiving RN aware. Patient to be supine for 4 hrs after procedure. Patient able to maintain patent airway and does not need supplemental O2. Patient able to wiggle fingers and toes appropriately.

## 2020-01-16 NOTE — PROGRESS NOTES
1915: Assumed patient care from [address] [place] [address]. Patient is alert and oriented to person, place, time and situation. Respiratory status Is stable on room air. Vital signs are stable. MEWS score is a one. Patient states he has abdominal pain 3/10, but denies nausea vomiting dizziness or anxiety. White board and fall card is updated. Bed is locked and in lowest position. Call bell, water and personal belongings are within reach. Patient has no questions, comments or concerns after bedside shift report. 2130: Patient asked for and received one 5/325 mg Norco for left lateral chest pain which he rated at a 6/10, with five being an acceptable level. 2230: Patient rated his pain at a 3/10    0210: Patient asked for and received one 5/325 mg Norco for left lateral chest pain which he rated at a 6/10, with five being an acceptable level. 0300: Patient appears to be sleeping with a respiratory rate grater than ten and no signs of distress noted. 0700: Patient had an uneventful shift. Respiratory status, vital signs and MEWS score remained stable. Patient was resting quietly with no signs of distress noted. Bed locked and in lowest position. Call bell water and personal belongings were within reach. Patient had no questions, comments or concerns after bedside shift report.  Bedside report given to [facility] G[facility]

## 2020-01-16 NOTE — PROGRESS NOTES
Problem: Falls - Risk of  Goal: *Absence of Falls  Description  Document Adolfo Barclay Fall Risk and appropriate interventions in the flowsheet.   Outcome: Progressing Towards Goal  Note: Fall Risk Interventions:    Medication Interventions: Patient to call before getting OOB, Teach patient to arise slowly    Elimination Interventions: Call light in reach, Toilet paper/wipes in reach       Problem: Pain  Goal: *Control of Pain  Outcome: Progressing Towards Goal

## 2020-01-16 NOTE — PROGRESS NOTES
Pt MEWS score 3 related to bradycardia. Pt assessed to be resting in bed quietly.  PRN pain meds given per request. Will keep monitoring pt HR.     01/16/20 0720   Vital Signs   Temp 97.9 °F (36.6 °C)   Temp Source Oral   Pulse (Heart Rate) (!) 47   Heart Rate Source Brachial   Resp Rate 17   O2 Sat (%) 98 %   Level of Consciousness Alert   /59   MAP (Calculated) 73   BP 1 Method Automatic   BP 1 Location Left arm   BP Patient Position Supine   MEWS Score 3

## 2020-01-16 NOTE — CONSULTS
Consult Note    Patient: Den Looney               Sex: male          DOA: 1/16/2020         YOB: 1985      Age:  37 y.o.        LOS:  LOS: 0 days              HPI:     Den Looney is a 37 y.o. male who has been seen in evaluation of hemoptysis at the request of Dr. Shalonda Henley. Patient has a history of hereditary hemorrhagic telangectasia and presented as a transfer from [facility] with recurrent hemoptysis. He has undergone extensive evaluations to determine the cause of his hemoptysis including bronchoscopies (+8), genetic testing, serological workup, coagulopathy workup, rheumatological workup and multiple CTAs. Per patient recall, a bronchial artery embolization was attempted in 2017 but they were unsuccessful due to difficult anatomy. Patient was admitted to [place] for further evaluation. Currently, patient endorses left-sided chest pain.     Past Medical History:   Diagnosis Date    H/O: hematuria     Hemoptysis     History of bradycardia     Tobacco abuse        Past Surgical History:   Procedure Laterality Date    HX APPENDECTOMY      HX ORTHOPAEDIC  1989    multiple right arm fractures repaired as a child    HX OTHER SURGICAL      Bronchoscopy, endoscopy       Family History   Problem Relation Age of Onset    Cancer Mother     Other Father         killed by step-brother per patient    Bleeding Prob Other         daughter with recurrent epistaxis       Social History     Socioeconomic History    Marital status:      Spouse name: Not on file    Number of children: 3    Years of education: Not on file    Highest education level: Not on file   Occupational History    Occupation:    Tobacco Use    Smoking status: Current Every Day Smoker     Packs/day: 0.50     Years: 12.00     Pack years: 6.00     Types: Cigarettes    Smokeless tobacco: Current User   Substance and Sexual Activity    Alcohol use: No    Drug use: No    Sexual activity: Yes Social History Narrative    Lives alone, no pets       Prior to Admission medications    Medication Sig Start Date End Date Taking? Authorizing Provider   HYDROcodone-acetaminophen (NORCO) 5-325 mg per tablet Take 1 Tab by mouth every four (4) hours as needed for Pain. Indications: pain    Provider, Historical   ondansetron HCl (ZOFRAN IV) 4 mg by IntraVENous route every four (4) hours as needed for Nausea. Provider, Historical   TRANEXAMIC ACID PO Take 1,300 mg by mouth every eight (8) hours. Indications: coughing up of blood 1/11/20 1/15/20  Provider, Historical   nitroglycerin (NITROSTAT) 0.4 mg SL tablet 0.4 mg by SubLINGual route every five (5) minutes as needed for Chest Pain. Up to 3 doses. Provider, Historical   aminocaproic acid (AMICAR) 500 mg tablet Take 1,000 mg by mouth every eight (8) hours. 1/12/20   Provider, Historical   benzonatate (TESSALON PERLES) 100 mg capsule Take 100 mg by mouth three (3) times daily as needed for Cough. Indications: cough    Provider, Historical   docusate sodium (COLACE) 100 mg capsule Take 100 mg by mouth two (2) times daily as needed for Constipation. Indications: constipation    Provider, Historical   bisacodyl 5 mg tab Take 10 mg by mouth daily as needed. Indications: constipation    Provider, Historical       Allergies   Allergen Reactions    Barium Sulfate Anaphylaxis     CT oral contrast per pt    Morphine (Pf) Anaphylaxis    Barium Iodide Anaphylaxis       Review of Systems  Pertinent items are noted in the History of Present Illness. Physical Exam:      Visit Vitals  /60 (BP 1 Location: Left arm, BP Patient Position: Supine)   Pulse (!) 50   Resp 15   SpO2 100%       Physical Exam:  Constitutional: NAD. A&Ox4. Resting comfortably in bed. Respiratory: Normal respiratory effort. Symmetrical rise and fall of chest. Actively coughing up small amounts of bright red blood. Cardiovascular: Regular rate. Gastrointestinal: Soft, NT, ND.      Labs Reviewed:  CMP: No results found for: NA, K, CL, CO2, AGAP, GLU, BUN, CREA, GFRAA, GFRNA, CA, MG, PHOS, ALB, TBIL, TP, ALB, GLOB, AGRAT, SGOT, ALT, GPT  CBC: No results found for: WBC, HGB, HGBEXT, HCT, HCTEXT, PLT, PLTEXT, HGBEXT, HCTEXT, PLTEXT  COAGS: No results found for: APTT, PTP, INR, INREXT, INREXT    Imaging:   CTA from [facility] admission reviewed by Dr. Peggyann Schilder. Assessment     Active Problems:    * No active hospital problems. *    Krystin Menard is a 37 y.o. male with a history of hereditary hemorrhagic telangiectasia presenting with worsening and recurrent hemoptysis. Hemoglobin stable. Plan   Case and images reviewed by Dr. Peggyann Schilder. Given that he continues to experience hemoptysis without a definite site of bleeding and he has failed prior interventions, will plan for image-guided bronchial angiogram +/- embolization with moderate sedation as IR schedule allows. Patient to remain NPO with blood thinning medications held. Consent to be obtained prior to procedure.

## 2020-01-17 ENCOUNTER — APPOINTMENT (OUTPATIENT)
Dept: GENERAL RADIOLOGY | Age: 35
DRG: 950 | End: 2020-01-17
Attending: INTERNAL MEDICINE
Payer: MEDICAID

## 2020-01-17 VITALS
TEMPERATURE: 98 F | DIASTOLIC BLOOD PRESSURE: 64 MMHG | WEIGHT: 131.7 LBS | SYSTOLIC BLOOD PRESSURE: 109 MMHG | RESPIRATION RATE: 17 BRPM | OXYGEN SATURATION: 99 % | HEART RATE: 46 BPM | BODY MASS INDEX: 19.51 KG/M2 | HEIGHT: 69 IN

## 2020-01-17 LAB — GLUCOSE BLD STRIP.AUTO-MCNC: 93 MG/DL (ref 70–110)

## 2020-01-17 PROCEDURE — 74011000250 HC RX REV CODE- 250: Performed by: HOSPITALIST

## 2020-01-17 PROCEDURE — 74011250636 HC RX REV CODE- 250/636: Performed by: INTERNAL MEDICINE

## 2020-01-17 PROCEDURE — 74011250637 HC RX REV CODE- 250/637: Performed by: HOSPITALIST

## 2020-01-17 PROCEDURE — C9113 INJ PANTOPRAZOLE SODIUM, VIA: HCPCS | Performed by: HOSPITALIST

## 2020-01-17 PROCEDURE — 82962 GLUCOSE BLOOD TEST: CPT

## 2020-01-17 PROCEDURE — 71046 X-RAY EXAM CHEST 2 VIEWS: CPT

## 2020-01-17 PROCEDURE — 74011250636 HC RX REV CODE- 250/636: Performed by: HOSPITALIST

## 2020-01-17 PROCEDURE — 77030027138 HC INCENT SPIROMETER -A

## 2020-01-17 PROCEDURE — 74011250637 HC RX REV CODE- 250/637: Performed by: INTERNAL MEDICINE

## 2020-01-17 PROCEDURE — 74011250637 HC RX REV CODE- 250/637: Performed by: FAMILY MEDICINE

## 2020-01-17 RX ORDER — OXYCODONE AND ACETAMINOPHEN 5; 325 MG/1; MG/1
1 TABLET ORAL
Qty: 12 TAB | Refills: 0 | Status: SHIPPED | OUTPATIENT
Start: 2020-01-17 | End: 2020-01-20

## 2020-01-17 RX ORDER — KETOROLAC TROMETHAMINE 15 MG/ML
15 INJECTION, SOLUTION INTRAMUSCULAR; INTRAVENOUS EVERY 6 HOURS
Status: DISCONTINUED | OUTPATIENT
Start: 2020-01-17 | End: 2020-01-17 | Stop reason: HOSPADM

## 2020-01-17 RX ADMIN — OXYCODONE HYDROCHLORIDE AND ACETAMINOPHEN 2 TABLET: 5; 325 TABLET ORAL at 06:17

## 2020-01-17 RX ADMIN — OXYCODONE HYDROCHLORIDE AND ACETAMINOPHEN 2 TABLET: 5; 325 TABLET ORAL at 02:13

## 2020-01-17 RX ADMIN — SODIUM CHLORIDE 40 MG: 9 INJECTION, SOLUTION INTRAMUSCULAR; INTRAVENOUS; SUBCUTANEOUS at 08:56

## 2020-01-17 RX ADMIN — OXYCODONE HYDROCHLORIDE AND ACETAMINOPHEN 2 TABLET: 5; 325 TABLET ORAL at 10:12

## 2020-01-17 RX ADMIN — AMINOCAPROIC ACID 1000 MG: 500 TABLET ORAL at 06:00

## 2020-01-17 RX ADMIN — Medication 10 ML: at 06:18

## 2020-01-17 RX ADMIN — KETOROLAC TROMETHAMINE 15 MG: 15 INJECTION, SOLUTION INTRAMUSCULAR; INTRAVENOUS at 12:12

## 2020-01-17 RX ADMIN — ALUMINUM HYDROXIDE AND MAGNESIUM HYDROXIDE 30 ML: 200; 200 SUSPENSION ORAL at 12:12

## 2020-01-17 NOTE — ROUTINE PROCESS
Received bedside report from [address] [place] [address], patient was resting flat in bed, bed in lowest position and oriented to call button. Patient refused elevation of head. Gave bedside report to Alicia Mirza RN, using SBAR, MAR, and Kardex.

## 2020-01-17 NOTE — ROUTINE PROCESS
As part of the discharge instructions, medications already given today were discussed with the patient. The next dose due of all ordered meds was highlighted as part of the medication discharge instructions. Discussed with the patient the importance of taking medications as directed, as well as the side effects and adverse reactions to medications ordered. All questions answered, acknowledged understanding. Fe called for discharge home. 5020 Discharged to car via wheelchair.

## 2020-01-17 NOTE — PROGRESS NOTES
Progress Note      Patient: Sudha Ferrera               Sex: male          DOA: 1/15/2020         YOB: 1985      Age:  37 y.o.        LOS:  LOS: 2 days               Subjective:     Patient reports chest pain that feels like \"an expansion in his chest\" that worsens with food/drink. Once episode of hemoptysis around 2am with approximately 20cc expelled. No shortness of breath, fever, chills, nausea or vomiting. Objective:      Visit Vitals  /64 (BP 1 Location: Left arm, BP Patient Position: Supine; At rest)   Pulse (!) 46   Temp 98 °F (36.7 °C)   Resp 17   Ht 5' 9\" (1.753 m)   Wt 59.7 kg (131 lb 11.2 oz)   SpO2 99%   BMI 19.45 kg/m²       Physical Exam:  Constitutional: Appears uncomfortable. A&Ox4. Resting in bed. Respiratory: Normal respiratory effort. Symmetrical rise and fall of chest.  Cardiovascular: Bradycardic. R groin site with dressing c/d/i. Gastrointestinal: Soft, NT, ND. Intake and Output:  Current Shift:  01/17 0701 - 01/17 1900  In: 720 [P.O.:720]  Out: 1250 [EQSBO:5222]  Last three shifts:  01/15 1901 - 01/17 0700  In: 1600 [P.O.:1600]  Out: 1750 [Urine:1750]    Lab/Data Reviewed: All lab results for the last 24 hours reviewed. Medications Reviewed    Assessment/Plan     Principal Problem:    Cough with hemoptysis (1/15/2020)    Active Problems:    Chest pain in adult (1/15/2020)      Tobacco abuse (2/28/2018)      Bradycardia (7/22/2016)      Patient is POD#1 s/p bilateral bronchial artery embolization by Dr. Juan Antonio Sevilla. From an IR standpoint, patient doing well without any apparent complications. Continue to trend H/H and monitor for hemopytsis. Chest pain likely related to post-embolization ischemia from other regions supplied by the bronchial arteries. Recommend GI cocktail which may improve pain if GI related. Discussed with Dr. Inge Roper and Dr. Brandy Cat. Patient to follow up in 4 weeks with his Pulmonologist, Dr. Manny Cameron.

## 2020-01-17 NOTE — ROUTINE PROCESS
C/O sternum/chest pain. Too early to be remedicated. Able to wait although he rates the pain an 8. Body language does not reflect pain level. In no resp. Distress.

## 2020-01-17 NOTE — ROUTINE PROCESS
Bedside and Verbal shift change report given to Chary Lloyd (oncoming nurse) by Leatha Castle RN (offgoing nurse). Report included the following information SBAR, Kardex, Intake/Output, MAR and Recent Results.

## 2020-01-17 NOTE — PROCEDURES
RADIOLOGY POST PROCEDURE NOTE     January 16, 2020       7:35 PM     Preoperative Diagnosis:   Hemoptysis/    Postoperative Diagnosis:  Same. : Dr. Martina Parker    Assistant:  None. Type of Anesthesia: 1% plain lidocaine and IV moderate sedation with Versed and Fentanyl. Procedure/Description:  Image guided pulmonary angiography. Image guided bilateral bronchial artery embolization. Findings:   No AVM or bleeding present. No bronchial artery hypertrophy. Estimated blood Loss:  Minimal    Specimen Removed:   no    Blood transfusions:  None. Implants:  See report.     Complications: None    Condition: Stable    Discharge Plan:  continue present therapy    Joi Payne MD

## 2020-01-17 NOTE — PROGRESS NOTES
[address] [facility]  Pulmonary, Critical Care, and Sleep Medicine    Progress Note    Name: Claudette Orozco MRN: [id number]   : 1985 Hospital: [address] [place] [address]   Date: 2020        IMPRESSION:   · Recurrent hemoptysis with extensive work-up failing to show specific etiology. Patient states that he has a diagnosis of HHT. No imaging abnormalities to suggest bronchiectasis, cystic or vasculitic lung disease. S/P bronchial artery embolization- bilateral 2020  · Chest Pain- post procedure will need monitoring. Discussed with IR-likely related to bronchial artery embolization with branch artery feeding pericardium also being embolized which can cause pericarditis-like syndrome which can account for the pain. Can consider obtaining troponin but would not   · Splinting-secondary to chest pain  · History of hematuria  · History of isolated positive AMELIA in  with subsequent AMELIA as well as double-stranded DNA, ANCA serologies all been negative in 2017      RECOMMENDATIONS:   · Monitor for hemoptysis post procedure  · Will check imaging-chest x-ray  · Add incentive spirometer  · Continue with judicious pain management  · As noted he has been extensively worked up at [facility] without a definitive diagnosis to explain his continued hemoptysis. No further testing needed. · We will follow and make further recommendation depending on course     Subjective: This patient has been seen and evaluated at the request of Stephan Sanchez for recurring hemoptysis. Patient is a 37 y.o. male was accepted in transfer from [facility] by interventional radiology for bronchial arteriogram and possible embolization procedure. Patient has a long complicated history of recurring hemoptysis for the past 8 years. 20   Seen post procedure- bronchial arterial embolization.   C/o chest pain- all across-states he feels like he has a balloon in his chest and cannot rest  Cannot take a deep breath  Denies hemoptysis  Denies cough  Laying flat  No nausea  Denies other complaints      HPI:  He describes history of large amount of coughed up blood, this is recurrent hemoptysis and also has had  hematuria. His hemoptysis started at age 32 it is present nearly daily in small amounts, \"streaky\" with interspread episodes of severe, even massive hemoptysis: On one or more occaions has had syncope after hemoptysis. He has had extensive evaluations by multiple specialists: MDs / Eddy Bowman / Thoracic surgery and multiple Pulmonologist and GI MDs, has had EGDs and bronchoscopies multiple times, none of them, per himself, able to clearly define the source of bleeding. He has seen [facility], [address] [place] [address], [facility] , [facility], [facility], thoracic surgery. 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 He more recently has been following up with Dr. Bel Oliver pulmonologist who on her last visit has noted following  \"- I am without explanation for continued hemoptysis  -Suspect pt has strange form of HHT or nasal hemangioma that we cannot find  -has had extensive workup including multiple bronchcoscopies, one with hep gtt to induce bleeding and all have been negative  -CTA multiple times for AVMs with none found  -head and neck CT negative  -eval for extrameduallary hematopeoisis has been negative  -trials of steroids, atenolol, asthma medications  -ENT Has seen pt   -EGD negative on last admission  -tried TXA 1600 TID x 1 month with no benefit. - No imaging or lab to indicate source of bleeding\"    In addition applicable cardiology and other lab data were also reviewed.     Past Medical History:   Diagnosis Date    H/O: hematuria     Hemoptysis     History of bradycardia     Tobacco abuse       Past Surgical History: Procedure Laterality Date    HX APPENDECTOMY      HX ORTHOPAEDIC  1989    multiple right arm fractures repaired as a child    HX OTHER SURGICAL      Bronchoscopy, endoscopy      Prior to Admission medications    Medication Sig Start Date End Date Taking? Authorizing Provider   HYDROcodone-acetaminophen (NORCO) 5-325 mg per tablet Take 1 Tab by mouth every four (4) hours as needed for Pain. Indications: pain   Yes Provider, Historical   ondansetron HCl (ZOFRAN IV) 4 mg by IntraVENous route every four (4) hours as needed for Nausea. Yes Provider, Historical   nitroglycerin (NITROSTAT) 0.4 mg SL tablet 0.4 mg by SubLINGual route every five (5) minutes as needed for Chest Pain. Up to 3 doses. Yes Provider, Historical   aminocaproic acid (AMICAR) 500 mg tablet Take 1,000 mg by mouth every eight (8) hours. 1/12/20  Yes Provider, Historical   benzonatate (TESSALON PERLES) 100 mg capsule Take 100 mg by mouth three (3) times daily as needed for Cough. Indications: cough   Yes Provider, Historical   docusate sodium (COLACE) 100 mg capsule Take 100 mg by mouth two (2) times daily as needed for Constipation. Indications: constipation   Yes Provider, Historical   bisacodyl 5 mg tab Take 10 mg by mouth daily as needed. Indications: constipation   Yes Provider, Historical     Allergies   Allergen Reactions    Barium Sulfate Anaphylaxis     CT oral contrast per pt    Morphine (Pf) Anaphylaxis    Barium Iodide Anaphylaxis      Current Facility-Administered Medications   Medication Dose Route Frequency    sodium chloride (NS) flush 5-40 mL  5-40 mL IntraVENous Q8H    docusate sodium (COLACE) capsule 100 mg  100 mg Per NG tube DAILY    aminocaproic acid (AMICAR) tablet 1,000 mg  1,000 mg Oral Q8H    pantoprazole (PROTONIX) 40 mg in 0.9% sodium chloride 10 mL injection  40 mg IntraVENous DAILY       Review of Systems:  A comprehensive review of systems was negative except for that written in the HPI.     Objective: Vital Signs:    Visit Vitals  /75   Pulse 61   Temp 98.3 °F (36.8 °C)   Resp 18   Ht 5' 9\" (1.753 m)   Wt 59.7 kg (131 lb 11.2 oz)   SpO2 97%   BMI 19.45 kg/m²       O2 Device: Room air       Temp (24hrs), Av.2 °F (36.8 °C), Min:97.7 °F (36.5 °C), Max:98.6 °F (37 °C)       Intake/Output:   Last shift:       07 - 1900  In: 480 [P.O.:480]  Out: 1250 [Urine:1250]  Last 3 shifts: 01/15 190 -  0700  In: 1600 [P.O.:1600]  Out: 1750 [Urine:1750]    Intake/Output Summary (Last 24 hours) at 2020 1039  Last data filed at 2020 1018  Gross per 24 hour   Intake 1600 ml   Output 2750 ml   Net -1150 ml      Physical Exam:   General:  Alert, cooperative, no distress, appears stated age. Head:  Normocephalic, without obvious abnormality, atraumatic. Eyes:  Conjunctivae/corneas clear. PERRL, EOMs intact. Nose: Nares normal. Septum midline. Mucosa normal. No drainage or sinus tenderness. Throat: Lips, mucosa, and tongue normal. Teeth and gums normal.   Neck: Supple, symmetrical, trachea midline, no adenopathy, thyroid: no enlargment/tenderness/nodules, no carotid bruit and no JVD. Back:   Symmetric, no curvature. ROM normal.   Lungs:   Clear to auscultation bilaterally. Diminished breath sounds at both bases   Chest wall:  No tenderness or deformity. Poor excursion with splinting   Heart:  Regular rate and rhythm, S1, S2 normal, no murmur, click, rub or gallop. Abdomen:   Soft, non-tender. Bowel sounds normal. No masses,  No organomegaly. Extremities: Extremities normal, atraumatic, no cyanosis or edema. Pulses: 2+ and symmetric all extremities.    Skin: Skin color, texture, turgor normal. No rashes or lesions   Lymph nodes: Cervical, supraclavicular, and axillary nodes normal.   Neurologic: Grossly nonfocal     Data review:     Recent Results (from the past 24 hour(s))   HGB & HCT    Collection Time: 20 12:54 PM   Result Value Ref Range    HGB 12.9 (L) 13.0 - 16.0 g/dL HCT 37.4 36.0 - 48.0 %   URINALYSIS W/MICROSCOPIC    Collection Time: 01/16/20  2:00 PM   Result Value Ref Range    Color YELLOW      Appearance CLEAR      Specific gravity >1.030 (H) 1.005 - 1.030    pH (UA) 5.0 5.0 - 8.0      Protein NEGATIVE  NEG mg/dL    Glucose NEGATIVE  NEG mg/dL    Ketone NEGATIVE  NEG mg/dL    Bilirubin NEGATIVE  NEG      Blood NEGATIVE  NEG      Urobilinogen 1.0 0.2 - 1.0 EU/dL    Nitrites NEGATIVE  NEG      Leukocyte Esterase NEGATIVE  NEG      WBC NONE 0 - 4 /hpf    RBC NONE 0 - 5 /hpf    Epithelial cells FEW 0 - 5 /lpf    Bacteria FEW (A) NEG /hpf     Imaging:  I have personally reviewed the patients radiographs and have reviewed the reports:  XR Results (most recent):  Results from Hospital Encounter encounter on 01/15/20   XR CHEST PORT    Narrative Portable Chest    CPT CODE: 11619    HISTORY: Cough and hemoptysis. FINDINGS:     No prior study for comparison. Several wires overlie the patient. Heart size and mediastinal contours are within normal limits. No pulmonary  vascular congestion, effusion, or pneumothorax. No acute consolidation. Old  right-sided anterior right sixth rib fracture. .      Impression IMPRESSION:    No radiographic finding for an acute cardiopulmonary process       CT Results (most recent):  Results from Hospital Encounter encounter on 07/03/17   CTA CHEST W OR W WO CONT    Narrative Indication: Hemoptysis. Impression IMPRESSION: No pulmonary embolism. Resolved pulmonary nodules. Comment: 3-D CTA of the chest was performed following administration of  intravenous contrast. Multiplanar PE protocol and MIPS projections were  obtained. This was compared with March 25, 2017 and November 15, 2016. There is no pulmonary embolism or aortic dissection. The heart is of normal  size. Aorta is of normal caliber. No pleural or pericardial effusion. Small tiny pulmonary nodules seen on prior studies have resolved.  No pulmonary  infiltrate nodule or mass.    No adenopathy. The adrenal glands are unremarkable. Complex decision making was made in the evaluation and management plans during this consultation. More than 50% of time was spent in counseling and coordination of care including review of data and discussion with other team members.          Madison Davis MD

## 2020-01-17 NOTE — DISCHARGE INSTRUCTIONS
Patient armband removed and shredded    DISCHARGE SUMMARY from Nurse    PATIENT INSTRUCTIONS:    ·     What to do at Home:  Recommended activity: Bedrest, as needed. If you experience any of the following symptoms increased spitting up of blood,chest pain unrelieved with pain med. please follow up with nearest emergency room or primary care physician. *  Please give a list of your current medications to your Primary Care Provider. *  Please update this list whenever your medications are discontinued, doses are      changed, or new medications (including over-the-counter products) are added. *  Please carry medication information at all times in case of emergency situations. These are general instructions for a healthy lifestyle:    No smoking/ No tobacco products/ Avoid exposure to second hand smoke  Surgeon General's Warning:  Quitting smoking now greatly reduces serious risk to your health. Obesity, smoking, and sedentary lifestyle greatly increases your risk for illness    A healthy diet, regular physical exercise & weight monitoring are important for maintaining a healthy lifestyle    You may be retaining fluid if you have a history of heart failure or if you experience any of the following symptoms:  Weight gain of 3 pounds or more overnight or 5 pounds in a week, increased swelling in our hands or feet or shortness of breath while lying flat in bed. Please call your doctor as soon as you notice any of these symptoms; do not wait until your next office visit. The discharge information has been reviewed with the patient. The patient verbalized understanding. Discharge medications reviewed with the patient and appropriate educational materials and side effects teaching were provided.   ___________________________________________________________________________________________________________________________________

## 2020-01-17 NOTE — DISCHARGE SUMMARY
[facility]  [facility]       Patient: Chris Yanez Age: 37 y.o. : 1985 MR#: [id number] SSN: [id number]  PCP on record: Angela Stanford MD  Admit date: 1/15/2020  Discharge date: 2020    Consults:  -Rachel Wolf, PA- IR  -   Procedures:20 :Image guided pulmonary angiography. Image guided bilateral bronchial artery embolization.  -     Significant Diagnostic Studies: 20 CXR:  IMPRESSION:  1. Postsurgical changes as above. 2.  No evidence of acute cardiopulmonary process. -    Discharge Diagnoses: -Recurrent hemoptysis of uncertain etiology  -Post angiography and embolization chest pain                                          Patient Active Problem List   Diagnosis Code    Cough with hemoptysis R04.2    Chest pain in adult R07.9    Tobacco abuse Z72.0    Bradycardia R00.1       Hospital Course by Problem   Patient is a 57-year-old male with history of hemoptysis hemoptysis of uncertain etiology was transferred from [facility] for bronchial artery arteriogram as part of the work-up for his hemoptysis. Patient has had hemoptysis for about 8 years and has had extensive work-up with cardiothoracic surgery and ear nose and throat service and has been seen at several [facility] and also follows with central pulmonary. Patient was at [facility] prior to his transfer to this hospital and had presented there for hemoptysis. At that institution work-up did not reveal a source and patient reportedly improved after 2 days of treatment with TXA and aminocaproic acid. He was subsequently transferred here for possible bilateral bronchial artery embolization by interventional radiology. Patient underwent the interventional procedure here. Postprocedure he complained of having epigastric pain where he described every time he ate or drank getting a sensation like there is a balloon filling up in his stomach.   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 Chest x-ray was done and this did not show any acute process to explain his postprocedural pain. Patient was able to eat and drink despite the dysphagia. Otherwise he was stable and has been discharged with pain medications. As far as his hemoptysis this had improved at the time of his discharge. He remained hemodynamically stable his hemoglobin although with somewhat downward trend, overall stable. Today's examination of the patient revealed:     Subjective:     Objective:   VS:   Visit Vitals  /64 (BP 1 Location: Left arm, BP Patient Position: Supine; At rest)   Pulse (!) 46   Temp 98 °F (36.7 °C)   Resp 17   Ht 5' 9\" (1.753 m)   Wt 59.7 kg (131 lb 11.2 oz)   SpO2 99%   BMI 19.45 kg/m²      Tmax/24hrs: Temp (24hrs), Av.2 °F (36.8 °C), Min:97.7 °F (36.5 °C), Max:98.6 °F (37 °C)     Input/Output:     Intake/Output Summary (Last 24 hours) at 2020 1256  Last data filed at 2020 1205  Gross per 24 hour   Intake 1840 ml   Output 2750 ml   Net -910 ml       General: Alert, awake, no acute distress  Cardiovascular: Rhythm no murmurs  Pulmonary: Clear to auscultation bilaterally  GI: Abdomen is soft, nontender, nondistended  Extremities: Without edema  Additional:      Labs:    Recent Results (from the past 24 hour(s))   URINALYSIS W/MICROSCOPIC    Collection Time: 20  2:00 PM   Result Value Ref Range    Color YELLOW      Appearance CLEAR      Specific gravity >1.030 (H) 1.005 - 1.030    pH (UA) 5.0 5.0 - 8.0      Protein NEGATIVE  NEG mg/dL    Glucose NEGATIVE  NEG mg/dL    Ketone NEGATIVE  NEG mg/dL    Bilirubin NEGATIVE  NEG      Blood NEGATIVE  NEG      Urobilinogen 1.0 0.2 - 1.0 EU/dL    Nitrites NEGATIVE  NEG      Leukocyte Esterase NEGATIVE  NEG      WBC NONE 0 - 4 /hpf    RBC NONE 0 - 5 /hpf    Epithelial cells FEW 0 - 5 /lpf    Bacteria FEW (A) NEG /hpf   GLUCOSE, POC    Collection Time: 01/17/20 11:53 AM   Result Value Ref Range    Glucose (POC) 93 70 - 110 mg/dL     Additional Data Reviewed:     Condition on discharge: Stable  Disposition:    [x]Home   []Home with Home Health   []SNF/NH   []Rehab   []Home with family   []Alternate Facility:____________________      Discharge Medications:     Current Discharge Medication List      START taking these medications    Details   oxyCODONE-acetaminophen (PERCOCET) 5-325 mg per tablet Take 1 Tab by mouth every six (6) hours as needed for Pain for up to 3 days. Max Daily Amount: 4 Tabs. Qty: 12 Tab, Refills: 0    Associated Diagnoses: Cough with hemoptysis         CONTINUE these medications which have NOT CHANGED    Details   HYDROcodone-acetaminophen (NORCO) 5-325 mg per tablet Take 1 Tab by mouth every four (4) hours as needed for Pain. Indications: pain      ondansetron HCl (ZOFRAN IV) 4 mg by IntraVENous route every four (4) hours as needed for Nausea. nitroglycerin (NITROSTAT) 0.4 mg SL tablet 0.4 mg by SubLINGual route every five (5) minutes as needed for Chest Pain. Up to 3 doses. aminocaproic acid (AMICAR) 500 mg tablet Take 1,000 mg by mouth every eight (8) hours. benzonatate (TESSALON PERLES) 100 mg capsule Take 100 mg by mouth three (3) times daily as needed for Cough. Indications: cough      docusate sodium (COLACE) 100 mg capsule Take 100 mg by mouth two (2) times daily as needed for Constipation. Indications: constipation      bisacodyl 5 mg tab Take 10 mg by mouth daily as needed. Indications: constipation         STOP taking these medications       TRANEXAMIC ACID PO Comments:   Reason for Stopping: Follow-up Appointments:   1.  Your PCP: Alpa Baldwin MD, within 7-10days      >30 minutes spent coordinating this discharge (review instructions/follow-up, prescriptions, preparing report for sign off)    Signed:  Livan Cowart MD  1/17/2020  12:56 PM